# Patient Record
Sex: FEMALE | Race: WHITE | NOT HISPANIC OR LATINO | Employment: OTHER | URBAN - NONMETROPOLITAN AREA
[De-identification: names, ages, dates, MRNs, and addresses within clinical notes are randomized per-mention and may not be internally consistent; named-entity substitution may affect disease eponyms.]

---

## 2017-01-23 ENCOUNTER — OFFICE VISIT (OUTPATIENT)
Dept: OTOLARYNGOLOGY | Facility: CLINIC | Age: 77
End: 2017-01-23

## 2017-01-23 ENCOUNTER — OFFICE VISIT (OUTPATIENT)
Dept: UROLOGY | Facility: CLINIC | Age: 77
End: 2017-01-23

## 2017-01-23 VITALS
SYSTOLIC BLOOD PRESSURE: 125 MMHG | WEIGHT: 151 LBS | DIASTOLIC BLOOD PRESSURE: 64 MMHG | RESPIRATION RATE: 20 BRPM | TEMPERATURE: 97.7 F | HEIGHT: 65 IN | HEART RATE: 79 BPM | BODY MASS INDEX: 25.16 KG/M2

## 2017-01-23 VITALS
BODY MASS INDEX: 25.72 KG/M2 | WEIGHT: 154.4 LBS | DIASTOLIC BLOOD PRESSURE: 70 MMHG | HEIGHT: 65 IN | SYSTOLIC BLOOD PRESSURE: 120 MMHG | TEMPERATURE: 97.2 F

## 2017-01-23 DIAGNOSIS — H57.819 BROW PTOSIS: Primary | ICD-10-CM

## 2017-01-23 DIAGNOSIS — N30.90 RECURRENT CYSTITIS: Primary | ICD-10-CM

## 2017-01-23 DIAGNOSIS — H02.30 PSEUDOPTOSIS, UNSPECIFIED LATERALITY: ICD-10-CM

## 2017-01-23 LAB
BILIRUB BLD-MCNC: NEGATIVE MG/DL
CLARITY, POC: CLEAR
COLOR UR: YELLOW
GLUCOSE UR STRIP-MCNC: NEGATIVE MG/DL
KETONES UR QL: NEGATIVE
LEUKOCYTE EST, POC: ABNORMAL
NITRITE UR-MCNC: NEGATIVE MG/ML
PH UR: 5.5 [PH] (ref 5–8)
PROT UR STRIP-MCNC: NEGATIVE MG/DL
RBC # UR STRIP: NEGATIVE /UL
SP GR UR: 1.02 (ref 1–1.03)
UROBILINOGEN UR QL: NORMAL

## 2017-01-23 PROCEDURE — 81003 URINALYSIS AUTO W/O SCOPE: CPT | Performed by: UROLOGY

## 2017-01-23 PROCEDURE — 99213 OFFICE O/P EST LOW 20 MIN: CPT | Performed by: UROLOGY

## 2017-01-23 PROCEDURE — 99024 POSTOP FOLLOW-UP VISIT: CPT | Performed by: OTOLARYNGOLOGY

## 2017-01-23 NOTE — PROGRESS NOTES
Yeni Pastor presents for a routine postoperative visit following endoscopic pretrichial browlift/pexy and excisional biopsy of left lower eyelid lesion on 12/1/16.     Subjective: Since surgery, she is doing well without complaints.  Symptoms denied postoperatively include fever, chills, bleeding and drainage. The patient states the pain has ceased since surgery. She reports her visual fields have improved.  He still has some scalp numbness.    Objective:   Pathology review: Pathology is available. Pathology demonstrates hyperkeratosis with no evidence of malignancy of the left lower eyelid.        Physical Exam   Constitutional: She appears well-developed and well-nourished. She is cooperative. No distress.   HENT:   Frontal branch of CN VII intact- symmetrical movement of bilateral brows.  Excellent brow position   Neck: Phonation normal.   Pulmonary/Chest: Effort normal. No stridor. No respiratory distress.   Neurological: She is alert.   Psychiatric: She has a normal mood and affect. Her speech is normal and behavior is normal.     Assessment:  Yeni was seen today for follow-up.    Diagnoses and all orders for this visit:    Brow ptosis    Pseudoptosis, unspecified laterality        Plan:  Patient Instructions   Protect the incisions from sunlight. Sunlight to the incisions will cause permanent pigmentation to the incision line and make the incision more noticeable. After the incision has reepithelialized, you may begin to use sunscreen with an SPF of 15 or greater    I discussed the use of  Vitamin E, Mederma, or a high-quality extra virgin olive oil to the incisions to optimize the end result. Apply topically twice daily, or as directed, to help optimize wound healing and decrease erythema.    Discussed the importance of routine skin checks with a dermatologist every 6-12 months.         Return if symptoms worsen or fail to improve.

## 2017-01-23 NOTE — MR AVS SNAPSHOT
Yeni Pastor   1/23/2017 3:20 PM   Office Visit    Dept Phone:  [phone]   Encounter #:  [id number]    Provider:  Shaun Allen MD   Department:  [facility] UROLOGY                Your Full Care Plan              Your Updated Medication List          This list is accurate as of: 1/23/17  3:41 PM.  Always use your most recent med list.                albuterol 108 (90 BASE) MCG/ACT inhaler   Commonly known as:  PROVENTIL HFA;VENTOLIN HFA       budesonide-formoterol 160-4.5 MCG/ACT inhaler   Commonly known as:  SYMBICORT       ergocalciferol 78652 UNITS capsule   Commonly known as:  ERGOCALCIFEROL       esomeprazole 40 MG capsule   Commonly known as:  nexIUM       levothyroxine 50 MCG tablet   Commonly known as:  SYNTHROID, LEVOTHROID       melatonin 3 MG tablet       PROBIOTIC DAILY PO       * SINGULAIR 10 MG tablet   Generic drug:  montelukast       * montelukast 10 MG tablet   Commonly known as:  SINGULAIR       theophylline 600 MG 24 hr tablet   Commonly known as:  UNIPHYL       * Notice:  This list has 2 medication(s) that are the same as other medications prescribed for you. Read the directions carefully, and ask your doctor or other care provider to review them with you.            We Performed the Following     POC Urinalysis Dipstick, Automated       You Were Diagnosed With        Codes Comments    Recurrent cystitis    -  Primary ICD-10-CM: N30.90  ICD-9-CM: 595.9       Instructions     None    Patient Instructions History      Upcoming Appointments     Visit Type Date Time Department    FOLLOW UP 1/23/2017  1:30 PM [facility]    FOLLOW UP 1/23/2017  3:20 PM [facility]      [facility] Signup     [facility] MyColorScreen allows you to send messages to your doctor, view your test results, renew your prescriptions, schedule appointments, and more. To sign up, go to [employer] and click on the Sign Up Now link in the New User? box. Enter  "your [employer] Activation Code exactly as it appears below along with the last four digits of your Social Security Number and your Date of Birth () to complete the sign-up process. If you do not sign up before the expiration date, you must request a new code.    [employer] Activation Code: [email or web address]  Expires: 2017  1:47 PM    If you have questions, you can email [email or web address] or call [phone] to talk to our [employer] staff. Remember, [employer] is NOT to be used for urgent needs. For medical emergencies, dial 911.               Other Info from Your Visit           Allergies     Latex  Rash    Gloves    Marcaine [Bupivacaine Hcl]  Other (See Comments)    MOUTH SORES    Codeine  Other (See Comments)    Chest pain    Tramadol  Confusion      Vital Signs     Blood Pressure Temperature Height Weight Body Mass Index Smoking Status    120/70 97.2 °F (36.2 °C) 65\" (165.1 cm) 154 lb 6.4 oz (70 kg) 25.69 kg/m2 Never Smoker      Problems and Diagnoses Noted     Inflammation of bladder    -  Primary      Results     POC Urinalysis Dipstick, Automated      Component Value Standard Range & Units    Color Yellow Yellow, Straw, Dark Yellow, Nahed    Clarity, UA Clear Clear    Glucose, UA Negative Negative, 1000 mg/dL (3+) mg/dL    Bilirubin Negative Negative    Ketones, UA Negative Negative    Specific Gravity  1.020 1.005 - 1.030    Blood, UA Negative Negative    pH, Urine 5.5 5.0 - 8.0    Protein, POC Negative Negative mg/dL    Urobilinogen, UA Normal Normal    Leukocytes Moderate (2+) Negative    Nitrite, UA Negative Negative                    "

## 2017-01-23 NOTE — MR AVS SNAPSHOT
Yeni Pastor   2017 1:30 PM   Office Visit    Dept Phone:  [phone]   Encounter #:  [id number]    Provider:  Romario Barber MD   Department:  [facility]                Your Full Care Plan              Your Updated Medication List          This list is accurate as of: 17  1:47 PM.  Always use your most recent med list.                albuterol 108 (90 BASE) MCG/ACT inhaler   Commonly known as:  PROVENTIL HFA;VENTOLIN HFA       budesonide-formoterol 160-4.5 MCG/ACT inhaler   Commonly known as:  SYMBICORT       ergocalciferol 35306 UNITS capsule   Commonly known as:  ERGOCALCIFEROL       esomeprazole 40 MG capsule   Commonly known as:  nexIUM       levothyroxine 50 MCG tablet   Commonly known as:  SYNTHROID, LEVOTHROID       melatonin 3 MG tablet       PROBIOTIC DAILY PO       * SINGULAIR 10 MG tablet   Generic drug:  montelukast       * montelukast 10 MG tablet   Commonly known as:  SINGULAIR       theophylline 600 MG 24 hr tablet   Commonly known as:  UNIPHYL       * Notice:  This list has 2 medication(s) that are the same as other medications prescribed for you. Read the directions carefully, and ask your doctor or other care provider to review them with you.            Instructions     None    Patient Instructions History      Upcoming Appointments     Visit Type Date Time Department    FOLLOW UP 2017  1:30 PM [facility]    FOLLOW UP 2017  3:20 PM [facility]      [facility] Signup     [facility] Stir allows you to send messages to your doctor, view your test results, renew your prescriptions, schedule appointments, and more. To sign up, go to [employer] and click on the Sign Up Now link in the New User? box. Enter your [employer] Activation Code exactly as it appears below along with the last four digits of your Social Security Number and your Date of Birth () to complete the sign-up process. If you do not sign up  "before the expiration date, you must request a new code.    [employer] Activation Code: [email or web address]  Expires: 2/6/2017  1:47 PM    If you have questions, you can email [email or web address] or call [phone] to talk to our [employer] staff. Remember, MyChart is NOT to be used for urgent needs. For medical emergencies, dial 911.               Other Info from Your Visit           Your Appointments     Jan 23, 2017  3:20 PM CST   Follow Up with Shaun Allen MD   [facility] UROLOGY (--)    [address] [place] [id number] [phone]           Arrive 15 minutes prior to appointment.              Allergies     Latex  Rash    Gloves    Marcaine [Bupivacaine Hcl]  Other (See Comments)    MOUTH SORES    Codeine  Other (See Comments)    Chest pain    Tramadol  Confusion      Reason for Visit     Follow-up BROW LIFT / LOWER EYELID LESION EXC      Vital Signs     Blood Pressure Pulse Temperature Respirations Height Weight    125/64 79 97.7 °F (36.5 °C) 20 65\" (165.1 cm) 151 lb (68.5 kg)    Body Mass Index Smoking Status                25.13 kg/m2 Never Smoker            "

## 2017-01-23 NOTE — PROGRESS NOTES
Ms. Pastor is 76 y.o. female    CHIEF COMPLAINT: I am here about urinary tract infections    HPI  Recurrent UTI  Patient is here for recurrent UTI.  This is a(an) subsequent visit. Possible coexisting conditions or situations that may predispose the patient to urinary tract include no obvious predisposition. The infections have been occurring for 2year(s).  There has been 2 infections in the last 1 year(s).  Organ involvement suggested by referring physician or patient is bladder. 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  Other evaluation includes cystoscopy and renal ultrasound.  Symptoms, when present, include dysuria and urgency and frequency. The patient has been treated with  prophylactic antibiotics  effective and topical hormonal replacement   effective.           The following portions of the patient's history were reviewed and updated as appropriate: allergies, current medications, past family history, past medical history, past social history, past surgical history and problem list.    Review of Systems   Constitutional: Negative for chills and fever.   Gastrointestinal: Negative for abdominal pain, anal bleeding and blood in stool.   Genitourinary: Negative for flank pain and hematuria.         Current Outpatient Prescriptions:   •  albuterol (PROVENTIL HFA;VENTOLIN HFA) 108 (90 BASE) MCG/ACT inhaler, Inhale 2 puffs Every 4 (Four) Hours As Needed., Disp: , Rfl:   •  budesonide-formoterol (SYMBICORT) 160-4.5 MCG/ACT inhaler, Inhale 2 puffs 2 (Two) Times a Day., Disp: , Rfl:   •  ergocalciferol (ERGOCALCIFEROL) 52469 UNITS capsule, Take 50,000 Units by mouth 1 (One) Time Per Week., Disp: , Rfl:   •  esomeprazole (nexIUM) 40 MG capsule, Take 40 mg by mouth Daily., Disp: , Rfl:   •  levothyroxine (SYNTHROID, LEVOTHROID) 50 MCG tablet, Take 50 mcg by mouth Daily., Disp: , Rfl:   •  melatonin 3 MG tablet, Take 3 mg by mouth daily, Disp: , Rfl:   •  montelukast (SINGULAIR) 10 MG  "tablet, Take 10 mg by mouth Daily., Disp: , Rfl:   •  montelukast (SINGULAIR) 10 MG tablet, Take 10 mg by mouth daily., Disp: , Rfl:   •  Probiotic Product (PROBIOTIC DAILY PO), Take 1 tablet/day by mouth Daily., Disp: , Rfl:   •  theophylline (UNIPHYL) 600 MG 24 hr tablet, Take 600 mg by mouth Daily., Disp: , Rfl:     Past Medical History   Diagnosis Date   • Asthma    • Brow ptosis    • Dental crowns present    • Dysphagia    • GERD (gastroesophageal reflux disease)    • History of multiple concussions    • Hypothyroidism    • 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   • Pseudoptosis    • Visual field defect      Unspecified       Past Surgical History   Procedure Laterality Date   • Blepharoplasty Bilateral      Bilateral Upper   •  section     • Cataract extraction     • Cholecystectomy     • Hysterectomy       Total   • Back surgery       C3 REPLACEMENT   • Bunionectomy Right    • Endoscopic browlift Bilateral 2016     Procedure: Pretrichial endoscopic brow lift, with excision left lower eyelid lesion;  Surgeon: Romario Barber MD;  Location: [facility];  Service:        Social History     Social History   • Marital status:      Spouse name: N/A   • Number of children: N/A   • Years of education: N/A     Social History Main Topics   • Smoking status: Never Smoker   • Smokeless tobacco: None   • Alcohol use No   • Drug use: No   • Sexual activity: Defer     Other Topics Concern   • None     Social History Narrative       Family History   Problem Relation Age of Onset   • Diabetes Mother    • Heart disease Mother    • Hypertension Mother    • Heart disease Father    • Hypertension Father    • Cancer Sister    • Heart disease Brother        Visit Vitals   • /70   • Temp 97.2 °F (36.2 °C)   • Ht 65\" (165.1 cm)   • Wt 154 lb 6.4 oz (70 kg)   • BMI 25.69 kg/m2       Physical Exam   Constitutional: She is oriented to person, place, and time. She appears well-developed and " well-nourished. No distress.   Pulmonary/Chest: Effort normal.   Abdominal: Soft. She exhibits no distension and no mass. There is no tenderness. There is no rebound and no guarding. No hernia.   Neurological: She is alert and oriented to person, place, and time.   Skin: Skin is warm and dry. She is not diaphoretic.   Psychiatric: She has a normal mood and affect.   Vitals reviewed.        Results for orders placed or performed in visit on 01/23/17   POC Urinalysis Dipstick, Automated   Result Value Ref Range    Color Yellow Yellow, Straw, Dark Yellow, Nahed    Clarity, UA Clear Clear    Glucose, UA Negative Negative, 1000 mg/dL (3+) mg/dL    Bilirubin Negative Negative    Ketones, UA Negative Negative    Specific Gravity  1.020 1.005 - 1.030    Blood, UA Negative Negative    pH, Urine 5.5 5.0 - 8.0    Protein, POC Negative Negative mg/dL    Urobilinogen, UA Normal Normal    Leukocytes Moderate (2+) (A) Negative    Nitrite, UA Negative Negative       Imaging Results (last 7 days)     ** No results found for the last 168 hours. **          Assessment and Plan  Diagnoses and all orders for this visit:    Recurrent cystitis  -     POC Urinalysis Dipstick, Automated     this problem is stable.  She has had a fairly recent infection.  I encouraged her to continue hormonal therapy on twice-daily basis.    Gloria Judd, [facility]  01/23/17  2:34 PM    EMR Dragon/Transcription disclaimer:  Much of this encounter note is an electronic transcription/translation of spoken language to printed text. The electronic translation of spoken language may permit erroneous, or at times, nonsensical words or phrases to be inadvertently transcribed; although I have reviewed the note for such errors, some may still exist.       Cc: Dr. Keller

## 2017-01-23 NOTE — LETTER
January 23, 2017     No Recipients    Patient: Yeni Pastor   YOB: 1940   Date of Visit: 1/23/2017       Dear Dr. Rocha Recipients:    Thank you for referring Yeni Pastor to me for evaluation. Below are the relevant portions of my assessment and plan of care.    If you have questions, please do not hesitate to call me. I look forward to following Yeni along with you.         Sincerely,        Romario Barber MD        CC: No Recipients  Romario Barber MD  1/23/2017  1:48 PM  Signed  Yeni Pastor presents for a routine postoperative visit following endoscopic pretrichial browlift/pexy and excisional biopsy of left lower eyelid lesion on 12/1/16.     Subjective: Since surgery, she is doing well without complaints.  Symptoms denied postoperatively include fever, chills, bleeding and drainage. The patient states the pain has ceased since surgery. She reports her visual fields have improved.  He still has some scalp numbness.    Objective:   Pathology review: Pathology is available. Pathology demonstrates hyperkeratosis with no evidence of malignancy of the left lower eyelid.        Physical Exam   Constitutional: She appears well-developed and well-nourished. She is cooperative. No distress.   HENT:   Frontal branch of CN VII intact- symmetrical movement of bilateral brows.  Excellent brow position   Neck: Phonation normal.   Pulmonary/Chest: Effort normal. No stridor. No respiratory distress.   Neurological: She is alert.   Psychiatric: She has a normal mood and affect. Her speech is normal and behavior is normal.     Assessment:  Yeni was seen today for follow-up.    Diagnoses and all orders for this visit:    Brow ptosis    Pseudoptosis, unspecified laterality        Plan:  Patient Instructions   Protect the incisions from sunlight. Sunlight to the incisions will cause permanent pigmentation to the incision line and make the incision more noticeable. After the incision has reepithelialized, you  may begin to use sunscreen with an SPF of 15 or greater    I discussed the use of  Vitamin E, Mederma, or a high-quality extra virgin olive oil to the incisions to optimize the end result. Apply topically twice daily, or as directed, to help optimize wound healing and decrease erythema.    Discussed the importance of routine skin checks with a dermatologist every 6-12 months.         Return if symptoms worsen or fail to improve.

## 2017-03-14 ENCOUNTER — TELEPHONE (OUTPATIENT)
Dept: UROLOGY | Facility: CLINIC | Age: 77
End: 2017-03-14

## 2017-03-14 NOTE — TELEPHONE ENCOUNTER
----- Message from Nicolasa Lujan sent at 3/14/2017  1:12 PM CDT -----  Contact: PATIENT  Patient has an uti. Her symptoms are frequency and she is having burning and pressure. She said that Dr. Allen has prescribed her macrodantin in the past.

## 2017-06-16 DIAGNOSIS — Z79.899 CURRENT USE OF PROTON PUMP INHIBITOR: Primary | ICD-10-CM

## 2017-11-21 ENCOUNTER — OFFICE VISIT (OUTPATIENT)
Dept: CARDIOLOGY | Age: 77
End: 2017-11-21
Payer: MEDICARE

## 2017-11-21 ENCOUNTER — TELEPHONE (OUTPATIENT)
Dept: CARDIOLOGY | Age: 77
End: 2017-11-21

## 2017-11-21 VITALS
HEIGHT: 63 IN | HEART RATE: 80 BPM | SYSTOLIC BLOOD PRESSURE: 138 MMHG | DIASTOLIC BLOOD PRESSURE: 82 MMHG | BODY MASS INDEX: 26.93 KG/M2 | WEIGHT: 152 LBS

## 2017-11-21 DIAGNOSIS — R07.9 CHEST PAIN, UNSPECIFIED TYPE: Primary | ICD-10-CM

## 2017-11-21 DIAGNOSIS — R00.0 TACHYCARDIA: ICD-10-CM

## 2017-11-21 DIAGNOSIS — R06.02 SOB (SHORTNESS OF BREATH): ICD-10-CM

## 2017-11-21 DIAGNOSIS — I10 ESSENTIAL HYPERTENSION: ICD-10-CM

## 2017-11-21 PROCEDURE — 1036F TOBACCO NON-USER: CPT | Performed by: NURSE PRACTITIONER

## 2017-11-21 PROCEDURE — G8419 CALC BMI OUT NRM PARAM NOF/U: HCPCS | Performed by: NURSE PRACTITIONER

## 2017-11-21 PROCEDURE — 1123F ACP DISCUSS/DSCN MKR DOCD: CPT | Performed by: NURSE PRACTITIONER

## 2017-11-21 PROCEDURE — 99215 OFFICE O/P EST HI 40 MIN: CPT | Performed by: NURSE PRACTITIONER

## 2017-11-21 PROCEDURE — 93000 ELECTROCARDIOGRAM COMPLETE: CPT | Performed by: NURSE PRACTITIONER

## 2017-11-21 PROCEDURE — G8484 FLU IMMUNIZE NO ADMIN: HCPCS | Performed by: NURSE PRACTITIONER

## 2017-11-21 PROCEDURE — 4040F PNEUMOC VAC/ADMIN/RCVD: CPT | Performed by: NURSE PRACTITIONER

## 2017-11-21 PROCEDURE — G8400 PT W/DXA NO RESULTS DOC: HCPCS | Performed by: NURSE PRACTITIONER

## 2017-11-21 PROCEDURE — 1090F PRES/ABSN URINE INCON ASSESS: CPT | Performed by: NURSE PRACTITIONER

## 2017-11-21 PROCEDURE — G8427 DOCREV CUR MEDS BY ELIG CLIN: HCPCS | Performed by: NURSE PRACTITIONER

## 2017-11-21 RX ORDER — ATENOLOL 25 MG/1
25 TABLET ORAL NIGHTLY
Qty: 30 TABLET | Refills: 5 | Status: SHIPPED | OUTPATIENT
Start: 2017-11-21 | End: 2019-02-26

## 2017-11-21 NOTE — PROGRESS NOTES
[facility], [place]. [place] [address] [place] [address] 055, 042 [facility]  [phone] office  [phone] fax      OFFICE VISIT:  2017    Jairo Winter - : 1940    Reason For Visit:  Shara Pappas is a 68 y.o. female who is here for New Patient (New patient referral from Dr. Heriberto Soni for HTN, chest pain, RANGEL and tachycardia.); Chest Pain; Tachycardia; Hypertension; and Shortness of Breath    The patient presents today as a new patient referral from Dr. Jun Navarro. The patient has been having problems with elevated blood pressure and episodes of tachycardia. She reports \"my heart rate got up to 180 once. \"  Onset of symptoms approximately 2 months ago. The patient also reports episodes of sharp chest pain with exertion. The discomfort radiates into the left chest and scapula. She reports associated RANGEL. The patient also reports a history of asthma. She is on thyroid replacement therapy and reports recent labs were normal.  The patient reports a family history of CAD in mother and (2) brothers. She also reports elevated cholesterol \"but my good cholesterol makes the difference. \"   The patient's PCP monitors cholesterol. Subjective  Shara Pappas denies exertional chest pain, orthopnea, paroxysmal nocturnal dyspnea, syncope, presyncope, edema and fatigue. The patient denies numbness or weakness to suggest cerebrovascular accident or transient ischemic attack. + mid-sternal chest pain with radiation to left chest and scapular. Symptoms occur with exertion. Reports associated SOA. + episodes of tachycardia.     Jairo Winter has the following history as recorded in [facility]:    Patient Active Problem List   Diagnosis Code    History of adenomatous polyp of colon Z86.010    Epigastric pain R10.13    GERD (gastroesophageal reflux disease) K21.9    Diverticulosis K57.90    Migraine G43.909    History of traumatic brain injury Z87.820    Chest pain R07.9    SOB (shortness of breath) R06.02    Biatrial enlargement I51.7    Mild mitral regurgitation I34.0    Ankle edema M25.473    LLQ abdominal pain R10.32    Tortuous colon Q43.8    Current use of proton pump inhibitor Z79.899    Essential hypertension I10    Tachycardia R00.0     Past Medical History:   Diagnosis Date    Abdominal pain, other specified site     Asthma     Cataract     GERD (gastroesophageal reflux disease)     Glaucoma     Headache     Vitamin D deficiency      Past Surgical History:   Procedure Laterality Date    APPENDECTOMY      CATARACT REMOVAL      CERVICAL DISC SURGERY       SECTION      CHOLECYSTECTOMY      COLONOSCOPY  2009    Dr Мария Valdes    COLONOSCOPY  2012    Dr Nette Sorto ENDOSCOPY  2009    Dr Andre Knutson ENDOSCOPY  2012    Dr Мария Valdes     Family History   Problem Relation Age of Onset    Cancer Sister      breast cancer    Heart Disease Brother     Heart Disease Brother     Colon Polyps Neg Hx     COPD Neg Hx     Esophageal Cancer Neg Hx     Liver Cancer Neg Hx     Liver Disease Neg Hx     Rectal Cancer Neg Hx     Stomach Cancer Neg Hx      Social History   Substance Use Topics    Smoking status: Never Smoker    Smokeless tobacco: Never Used    Alcohol use No      Current Outpatient Prescriptions   Medication Sig Dispense Refill    Melatonin 1 MG CAPS Take 1 mg by mouth as needed      atenolol (TENORMIN) 25 MG tablet Take 1 tablet by mouth nightly 30 tablet 5    levothyroxine (SYNTHROID) 50 MCG tablet Take 50 mcg by mouth Daily      ergocalciferol (ERGOCALCIFEROL) 20931 UNITS capsule Take 50,000 Units by mouth once a week      Probiotic Product (PROBIOTIC ADVANCED PO) Take by mouth      budesonide-formoterol (SYMBICORT) 160-4.5 MCG/ACT AERO Inhale 2 puffs into the lungs 2 times daily      esomeprazole (NEXIUM) 40 MG capsule Take 1 capsule by mouth every morning (before breakfast) 90 capsule 3    Montelukast Sodium (SINGULAIR PO) Take 10 mg by mouth daily.  Theophylline (UNIPHYL PO) Take 600 mg by mouth daily.  Albuterol Sulfate (PROAIR HFA IN) Inhale  into the lungs as needed. No current facility-administered medications for this visit. Allergies: Latex; Codeine; and Marcaine [bupivacaine hcl]    Review of Systems  Constitutional  no appetite change, or unexpected weight change. No fever, chills or diaphoresis. No significant change in activity level or new onset of fatigue. HEENT  no significant rhinorrhea or epistaxis. No tinnitus or significant hearing loss. Eyes  no sudden vision change or amaurosis. No corneal arcus, xantholasma, subconjunctival hemorrhage or discharge. Respiratory  no significant wheezing, stridor, apnea or cough. No dyspnea on exertion or shortness of air. Cardiovascular  no orthopnea or PND. No occurrence of slow heart rate. No palpitations. No claudication. No leg edema.+ mid-sternal chest pain with radiation to left chest and scapular. Symptoms occur with exertion. Reports associated SOA. + episodes of tachycardia. Gastrointestinal  no abdominal swelling or pain. No blood in stool. No severe constipation, diarrhea, nausea, or vomiting. Genitourinary  no dysuria, frequency, or urgency. No flank pain or hematuria. Musculoskeletal  no back pain or myalgia. No problems with gait. Extremities - no clubbing, cyanosis or edema. Skin  no color change or rash. No pallor. No new surgical incision. Neurologic  no speech difficulty, facial asymmetry or lateralizing weakness. No seizures, presyncope or syncope. No significant dizziness. Hematologic  no easy bruising or excessive bleeding. Psychiatric  no severe anxiety or insomnia. No confusion. All other review of systems are negative.     Objective  Vital Signs - BP 138/82   Pulse 80   Ht 5' 3\" (1.6 m)   Wt 152 lb (68.9 kg)   BMI 26.93 kg/m²   General - Preethi Roberson is alert, cooperative, and pleasant. Well groomed. No acute distress. Body habitus - Body mass index is 26.93 kg/m². HEENT  Head is normocephalic. No circumoral cyanosis. Dentition is normal.  EYES -   Lids normal without ptosis. No discharge, edema or subconjunctival hemorrhage. Neck - Symmetrical without apparent mass or lymphadenopathy. Respiratory - Normal respiratory effort without use of accessory muscles. Ausculatation reveals vesicular breath sounds without crackles, wheezes, rub or rhonchi. Cardiovascular  No jugular venous distention. Auscultation reveals regular rate and rhythm. No audible clicks, gallop or rub. No murmur. No lower extremity varicosities. No carotid bruits. Abdominal -  No visible distention, mass or pulsations. Extremities - No clubbing or cyanosis. No statis dermatitis or ulcers. No edema. Musculoskeletal -   No Osler's nodes. No kyphosis or scoliosis. Gait is even and regular without limp or shuffle. Ambulates without assistance. Skin -  Warm and dry; no rash or pallor. No new surgical wound. Neurological - No focal neurological deficits. Thought processes coherent. No apparent tremor. Oriented to person, place and time. Psychiatric -  Appropriate affect and mood. Assessment:    1. Chest pain, unspecified type  NM MYOCARDIAL SPECT REST EXERCISE OR RX    ECHO Complete 2D W Doppler W Color   2. SOB (shortness of breath)  EKG 12 lead    ECHO Complete 2D W Doppler W Color   3. Tachycardia  Cardiac event monitor    ECHO Complete 2D W Doppler W Color   4. Essential hypertension  ECHO Complete 2D W Doppler W Color     EKG reviewed:  NSR 79 BPM; no acute ischemic changes or ectopy. Due to chest pain with SOA, patient will proceed with Lexiscan and 2D echocardiogram.  A 2 week Zio cardiac monitor was placed for further evaluation of tachycardia. Date/Time:  To be scheduled. [place] at the [facility] and [address] [facility] located on the first floor of [place] [address] through hospital main entrance and turn immediately to your left. Test Description:  There are two parts to a Lexiscan stress test: the rest portion and the exercise portion. For the rest portion, a radioactive tracer is injected into your arm through the IV. After 30 to 60 minutes, the process of imaging will begin. A nuclear camera will be placed on your chest area and images are taken for the next 15 to 20 minutes. For the exercise portion, a nurse will attach EKG electrodes to your chest to monitor your heart rate. The drug Excell Crock is administered to simulate stress on the heart. Your heart rhythm will then be monitored for the next few minutes. Your blood pressure will also be monitored throughout the exercise portion. [place] through the exercise portion, a second round of radioactive tracer is injected into your body. Your heart rate and EKG will be monitored for another few minutes after administering the drug. Test Preparation:     Bring a list of your current medications. Do not take any of your medications the morning of the test, but bring all morning medications with you as you will take them after the stress portion of the test is completed.  No caffeine 12 hours prior to the testing. This includes: coffee, pop/soda, chocolate, cold medications, etc.  Any product that might contain caffeine.  No nicotine or alcohol 12 hours prior to your test.    Nothing to eat or drink 4-6 hours prior to appointment time. It is okay to drink small amounts of water during the four hours prior to the test.   Nitroglycerin patches must be taken off 1 hour before testing.  Wear comfortable clothing.     Please refrain from any strenuous exercise or activities the day before your test, or the day of your test.   The [facility] [address] [place] [address] test takes about 2 ½ to 3 hours to complete. If for any reason you are unable to keep this appointment, please contact Outpatient Scheduling, [phone], as soon as possible to reschedule. What is a [place]? Luther Carver may be ordered by for those unable to exercise enough to increase blood flow to their heart during an exercise stress test.  [place] is used to help produce images of the heart for diagnosing blocked heart arteries. This test is not invasive. You will be awake during the entire test.    Your heart rhythm, blood pressure and oxygen level will be monitored during the test.  A small catheter will be placed in an arm vein. [place] is injected through the catheter into your bloodstream for 10 seconds followed immediately by saline solution to clear the line. [place] dilates the heart arteries to allow increased blood flow to the heart. 10-20 seconds after the saline solution, a small dose of radioactive isotope imaging tracer is injected into the catheter. After the tracer is absorbed in your system and distributed to the heart arteries, a special camera will take detailed pictures of your heart. The pictures will show how well the blood flows into your heart and if there are any areas of blockage. While you lie on your back with your arms above your head, the camera will take pictures for about 20-40 minutes. One set of images will be taken when the [place] is active in your system, and a second set of images will be taken when you're considered at rest.      Echocardiogram -  No prep. Takes approximately 30 min. An echocardiogram uses sound waves to produce images of your heart. This commonly used test allows your doctor to see how your heart is beating and pumping blood. Your doctor can use the images from an echocardiogram to identify various abnormalities in the heart muscle and valves.   This test has 2 parts:   Ø You will be asked to disrobe from the waist up and given

## 2017-11-21 NOTE — PATIENT INSTRUCTIONS
Start Atenolol 25 mg (1) tab at bedtime to lower blood pressure and heart rate. Continue other current medications as prescribed. Continue to follow up with primary care provider for non cardiac medical problems. Call the office with any problems, questions or concerns at [phone]. Follow up as scheduled with your cardiologist - Dr. Elva Soler one month. The following educational material has been included in this after visit summary for your review: heart health.     Additional instructions:  Coronary artery disease risk factors you can control: Smoking, high blood pressure, high cholesterol, diabetes, being overweight, lack of exercise and stress. Continue heart healthy diet. Take medications as directed. Exercise as tolerated. Strive for 15 minutes of exercise most days of the week. If asked to keep a blood pressure log, do so for 2 weeks. Call the office to report readings at [phone]. Blood pressure goal is 140/90 or less. If you are a diabetic, the goal is 130/80 or less. If you are taking cholesterol lowering medications, it is recommended that lab work be checked annually. Always keep a current medication list. Bring your medications to every office visit. Lavaughn Arabia Nuclear Stress Test     Date/Time:    [place] at the [facility] and [address] [facility] located on the first floor of [place] [address] through hospital main entrance and turn immediately to your left. Test Description:  There are two parts to a Lexiscan stress test: the rest portion and the exercise portion. For the rest portion, a radioactive tracer is injected into your arm through the IV. After 30 to 60 minutes, the process of imaging will begin. A nuclear camera will be placed on your chest area and images are taken for the next 15 to 20 minutes. For the exercise portion, a nurse will attach EKG electrodes to your chest to monitor your heart rate.   The drug Lavaughn Arabia is administered to desserts, and soda pop. Lifestyle changes  · If your doctor recommends it, get more exercise. Walking is a good choice. Bit by bit, increase the amount you walk every day. Try for at least 30 minutes on most days of the week. You also may want to swim, bike, or do other activities. · Do not smoke. If you need help quitting, talk to your doctor about stop-smoking programs and medicines. These can increase your chances of quitting for good. Quitting smoking may be the most important step you can take to protect your heart. It is never too late to quit. You will get health benefits right away. · Limit alcohol to 2 drinks a day for men and 1 drink a day for women. Too much alcohol can cause health problems. Medicines  · Take your medicines exactly as prescribed. Call your doctor if you think you are having a problem with your medicine. · If your doctor recommends aspirin, take the amount directed each day. Make sure you take aspirin and not another kind of pain reliever, such as acetaminophen (Tylenol). If you take ibuprofen (such as Advil or Motrin) for other problems, take aspirin at least 2 hours before taking ibuprofen. When should you call for help? Call 911 if you have symptoms of a heart attack. These may include:  · You have chest pain or pressure. This may occur with:  ¨ Chest pain or pressure, or a strange feeling in the chest.  ¨ Sweating. ¨ Shortness of breath. ¨ Pain, pressure, or a strange feeling in the back, neck, jaw, or upper belly or in one or both shoulders or arms. ¨ Lightheadedness or sudden weakness. ¨ A fast or irregular heartbeat. After you call 911, the  may tell you to chew 1 adult-strength or 2 to 4 low-dose aspirin. Wait for an ambulance. Do not try to drive yourself. Watch closely for changes in your health, and be sure to contact your doctor if:  · Your symptoms are slowly getting worse. · You do not get better as expected. Where can you learn more?   Go to

## 2017-12-18 ENCOUNTER — HOSPITAL ENCOUNTER (OUTPATIENT)
Dept: NUCLEAR MEDICINE | Age: 77
Discharge: HOME | End: 2017-12-18
Payer: MEDICARE

## 2017-12-18 ENCOUNTER — HOSPITAL ENCOUNTER (OUTPATIENT)
Dept: NON INVASIVE DIAGNOSTICS | Age: 77
Discharge: HOME | End: 2017-12-18
Payer: MEDICARE

## 2017-12-18 DIAGNOSIS — R07.9 CHEST PAIN, UNSPECIFIED TYPE: ICD-10-CM

## 2017-12-18 DIAGNOSIS — R06.02 SOB (SHORTNESS OF BREATH): ICD-10-CM

## 2017-12-18 DIAGNOSIS — R00.0 TACHYCARDIA: ICD-10-CM

## 2017-12-18 DIAGNOSIS — I10 ESSENTIAL HYPERTENSION: ICD-10-CM

## 2017-12-18 LAB
LV EF: 58 %
LV EF: 64 %
LVEF MODALITY: NORMAL
LVEF MODALITY: NORMAL

## 2017-12-18 PROCEDURE — 6360000002 HC RX W HCPCS: Performed by: NURSE PRACTITIONER

## 2017-12-18 PROCEDURE — 93306 TTE W/DOPPLER COMPLETE: CPT

## 2017-12-18 PROCEDURE — 3430000000 HC RX DIAGNOSTIC RADIOPHARMACEUTICAL: Performed by: NURSE PRACTITIONER

## 2017-12-18 PROCEDURE — A9500 TC99M SESTAMIBI: HCPCS | Performed by: NURSE PRACTITIONER

## 2017-12-18 PROCEDURE — 78452 HT MUSCLE IMAGE SPECT MULT: CPT

## 2017-12-18 PROCEDURE — 93017 CV STRESS TEST TRACING ONLY: CPT

## 2017-12-18 RX ADMIN — REGADENOSON 0.4 MG: 0.08 INJECTION, SOLUTION INTRAVENOUS at 12:37

## 2017-12-18 RX ADMIN — TETRAKIS(2-METHOXYISOBUTYLISOCYANIDE)COPPER(I) TETRAFLUOROBORATE 10 MILLICURIE: 1 INJECTION, POWDER, LYOPHILIZED, FOR SOLUTION INTRAVENOUS at 12:37

## 2017-12-18 RX ADMIN — TETRAKIS(2-METHOXYISOBUTYLISOCYANIDE)COPPER(I) TETRAFLUOROBORATE 30 MILLICURIE: 1 INJECTION, POWDER, LYOPHILIZED, FOR SOLUTION INTRAVENOUS at 12:37

## 2017-12-26 ENCOUNTER — OFFICE VISIT (OUTPATIENT)
Dept: CARDIOLOGY | Age: 77
End: 2017-12-26
Payer: MEDICARE

## 2017-12-26 VITALS
DIASTOLIC BLOOD PRESSURE: 68 MMHG | BODY MASS INDEX: 25.33 KG/M2 | SYSTOLIC BLOOD PRESSURE: 132 MMHG | RESPIRATION RATE: 16 BRPM | WEIGHT: 152 LBS | HEIGHT: 65 IN | HEART RATE: 80 BPM

## 2017-12-26 DIAGNOSIS — R00.0 TACHYCARDIA: Primary | ICD-10-CM

## 2017-12-26 DIAGNOSIS — I10 ESSENTIAL HYPERTENSION: ICD-10-CM

## 2017-12-26 PROCEDURE — G8419 CALC BMI OUT NRM PARAM NOF/U: HCPCS | Performed by: INTERNAL MEDICINE

## 2017-12-26 PROCEDURE — 1036F TOBACCO NON-USER: CPT | Performed by: INTERNAL MEDICINE

## 2017-12-26 PROCEDURE — 4040F PNEUMOC VAC/ADMIN/RCVD: CPT | Performed by: INTERNAL MEDICINE

## 2017-12-26 PROCEDURE — G8484 FLU IMMUNIZE NO ADMIN: HCPCS | Performed by: INTERNAL MEDICINE

## 2017-12-26 PROCEDURE — 1090F PRES/ABSN URINE INCON ASSESS: CPT | Performed by: INTERNAL MEDICINE

## 2017-12-26 PROCEDURE — 99215 OFFICE O/P EST HI 40 MIN: CPT | Performed by: INTERNAL MEDICINE

## 2017-12-26 PROCEDURE — G8400 PT W/DXA NO RESULTS DOC: HCPCS | Performed by: INTERNAL MEDICINE

## 2017-12-26 PROCEDURE — 1123F ACP DISCUSS/DSCN MKR DOCD: CPT | Performed by: INTERNAL MEDICINE

## 2017-12-26 PROCEDURE — G8427 DOCREV CUR MEDS BY ELIG CLIN: HCPCS | Performed by: INTERNAL MEDICINE

## 2017-12-26 NOTE — PROGRESS NOTES
160-4.5 MCG/ACT AERO Inhale 2 puffs into the lungs 2 times daily      esomeprazole (NEXIUM) 40 MG capsule Take 1 capsule by mouth every morning (before breakfast) 90 capsule 3    Montelukast Sodium (SINGULAIR PO) Take 10 mg by mouth daily.  Theophylline (UNIPHYL PO) Take 600 mg by mouth daily.  Albuterol Sulfate (PROAIR HFA IN) Inhale  into the lungs as needed. No current facility-administered medications for this visit.       Allergies: Latex; Tramadol; Codeine; and Marcaine [bupivacaine hcl]  Past Medical History:   Diagnosis Date    Abdominal pain, other specified site     Asthma     Cataract     GERD (gastroesophageal reflux disease)     Glaucoma     Headache     Vitamin D deficiency      Past Surgical History:   Procedure Laterality Date    APPENDECTOMY      CATARACT REMOVAL      CERVICAL DISC SURGERY       SECTION      CHOLECYSTECTOMY      COLONOSCOPY  2009    Dr Arielle Simms    COLONOSCOPY  2012    Dr Travis Thomas ENDOSCOPY  2009    Dr Aria Girard ENDOSCOPY  2012    Dr Arielle Simms     Family History   Problem Relation Age of Onset    Cancer Sister      breast cancer    Heart Disease Brother     Heart Disease Brother     Colon Polyps Neg Hx     COPD Neg Hx     Esophageal Cancer Neg Hx     Liver Cancer Neg Hx     Liver Disease Neg Hx     Rectal Cancer Neg Hx     Stomach Cancer Neg Hx      Social History   Substance Use Topics    Smoking status: Never Smoker    Smokeless tobacco: Never Used    Alcohol use No          Review of Systems:    General:      Complaint / Symptom Yes / No / Description if Yes       Fatigue No   Weight gain No   Insomnia No       Respiratory:        Complaint / Symptom Yes / No / Description if Yes       Cough No   Horseness No       Cardiovascular:    Complaint / Symptom Yes / No / Description if Yes       Chest Pain No   Shortness of Air / Orthopnea No   Presyncope / Syncope No   Palpitations Yes: still having palpitations and patient c/o of high blood pressure         Objective:    /68   Pulse 80   Resp 16   Ht 5' 5\" (1.651 m)   Wt 152 lb (68.9 kg)   BMI 25.29 kg/m²     GENERAL - well developed and well nourished, conversant  HEENT   PERRLA, Hearing appears normal  NECK - no thyromegaly, no JVD, trachea is in the midline  CARDIOVASCULAR  PMI is in the mid line clavicular position, Normal S1 and S2 with a grade 1/6 systolic murmur. No S3 or S4    PULMONARY  no respiratory distress. No wheezes or rales. Lungs are clear to ausculation   ABDOMEN   soft, non tender, no rebound  MUSCULOSKELETAL   range of motion of the upper and lower extermites appears normal and equal and is without pain   EXTREMITIES - no significant edema   NEUROLOGIC  gait and station are normal  SKIN - turgor is normal  PSYCHIATRIC - normal mood and affect, alert and orientated x 3,      ASSESSMENT:    ALL THE CARDIOLOGY PROBLEMS ARE LISTED ABOVE; HOWEVER, THE FOLLOWING SPECIFIC CARDIAC PROBLEMS / CONDITIONS WERE ADDRESSED AND TREATED DURING THE OFFICE VISIT TODAY:                                                                                            MEDICAL DECISION MAKING             Cardiac Specific Problem / Diagnosis  Discussion and Data Reviewed Diagnostic Procedures Ordered Management Options Selected           1. Tachycardia  show no change   Review and summation of old records:    3/7/14 DSE negative for myocardial ischemia  3/17/14 Echo normal LVFX, EF 50%  12/18/17 Echo normal LVFX, EF 55-60%  12/18/17 Cynthia negative for myocardial ischemia EF 64%  12/26/17 Zio NSR    Discussed with patient the possibility of placing a loop recorder. Advised patient all workup has been negative but it could be possible that it was just not picked up on zio. Gave the option of placing the loop recorder or just monitoring the patient.

## 2017-12-28 ENCOUNTER — SURG/PROC ORDERS (OUTPATIENT)
Dept: CARDIOLOGY | Age: 77
End: 2017-12-28

## 2017-12-28 DIAGNOSIS — R00.2 PALPITATIONS: ICD-10-CM

## 2017-12-28 DIAGNOSIS — R00.0 TACHYCARDIA: Primary | ICD-10-CM

## 2017-12-28 RX ORDER — CHLORHEXIDINE GLUCONATE 4 G/100ML
SOLUTION TOPICAL ONCE
Status: CANCELLED | OUTPATIENT
Start: 2017-12-28 | End: 2017-12-28

## 2017-12-28 RX ORDER — SODIUM CHLORIDE 9 MG/ML
INJECTION, SOLUTION INTRAVENOUS CONTINUOUS
Status: CANCELLED | OUTPATIENT
Start: 2017-12-28

## 2018-01-04 ENCOUNTER — HOSPITAL ENCOUNTER (OUTPATIENT)
Dept: CARDIAC CATH/INVASIVE PROCEDURES | Age: 78
Discharge: HOME | End: 2018-01-04
Attending: INTERNAL MEDICINE | Admitting: INTERNAL MEDICINE
Payer: MEDICARE

## 2018-01-04 VITALS
TEMPERATURE: 97.7 F | HEART RATE: 77 BPM | WEIGHT: 152 LBS | RESPIRATION RATE: 13 BRPM | BODY MASS INDEX: 25.33 KG/M2 | DIASTOLIC BLOOD PRESSURE: 51 MMHG | SYSTOLIC BLOOD PRESSURE: 122 MMHG | OXYGEN SATURATION: 97 % | HEIGHT: 65 IN

## 2018-01-04 DIAGNOSIS — R00.2 PALPITATIONS: ICD-10-CM

## 2018-01-04 DIAGNOSIS — R00.0 TACHYCARDIA: ICD-10-CM

## 2018-01-04 PROBLEM — Z95.818 STATUS POST PLACEMENT OF IMPLANTABLE LOOP RECORDER: Status: ACTIVE | Noted: 2018-01-04

## 2018-01-04 PROBLEM — R55 SYNCOPE: Status: ACTIVE | Noted: 2018-01-04

## 2018-01-04 LAB
EKG P AXIS: 63 DEGREES
EKG P-R INTERVAL: 138 MS
EKG Q-T INTERVAL: 424 MS
EKG QRS DURATION: 82 MS
EKG QTC CALCULATION (BAZETT): 429 MS
EKG T AXIS: 47 DEGREES

## 2018-01-04 PROCEDURE — 2500000003 HC RX 250 WO HCPCS

## 2018-01-04 PROCEDURE — 33282 PR IMPLANTATION PT-ACTIVATED CARDIAC EVENT RECORDER: CPT | Performed by: INTERNAL MEDICINE

## 2018-01-04 PROCEDURE — 33282 HC IMPANTABLE LOOP RECORDER: CPT | Performed by: INTERNAL MEDICINE

## 2018-01-04 PROCEDURE — 6360000002 HC RX W HCPCS

## 2018-01-04 PROCEDURE — 6370000000 HC RX 637 (ALT 250 FOR IP): Performed by: INTERNAL MEDICINE

## 2018-01-04 PROCEDURE — 93005 ELECTROCARDIOGRAM TRACING: CPT

## 2018-01-04 PROCEDURE — 99024 POSTOP FOLLOW-UP VISIT: CPT | Performed by: INTERNAL MEDICINE

## 2018-01-04 PROCEDURE — A4364 ADHESIVE, LIQUID OR EQUAL: HCPCS

## 2018-01-04 PROCEDURE — C1764 EVENT RECORDER, CARDIAC: HCPCS

## 2018-01-04 PROCEDURE — 2580000003 HC RX 258: Performed by: INTERNAL MEDICINE

## 2018-01-04 PROCEDURE — 6360000002 HC RX W HCPCS: Performed by: INTERNAL MEDICINE

## 2018-01-04 RX ORDER — SODIUM CHLORIDE 9 MG/ML
INJECTION, SOLUTION INTRAVENOUS CONTINUOUS
Status: DISCONTINUED | OUTPATIENT
Start: 2018-01-04 | End: 2018-01-04 | Stop reason: HOSPADM

## 2018-01-04 RX ORDER — ONDANSETRON 2 MG/ML
4 INJECTION INTRAMUSCULAR; INTRAVENOUS EVERY 6 HOURS PRN
Status: DISCONTINUED | OUTPATIENT
Start: 2018-01-04 | End: 2018-01-04 | Stop reason: HOSPADM

## 2018-01-04 RX ORDER — ACETAMINOPHEN 325 MG/1
650 TABLET ORAL EVERY 4 HOURS PRN
Status: DISCONTINUED | OUTPATIENT
Start: 2018-01-04 | End: 2018-01-04 | Stop reason: HOSPADM

## 2018-01-04 RX ORDER — CHLORHEXIDINE GLUCONATE 4 G/100ML
SOLUTION TOPICAL ONCE
Status: COMPLETED | OUTPATIENT
Start: 2018-01-04 | End: 2018-01-04

## 2018-01-04 RX ADMIN — SODIUM CHLORIDE: 9 INJECTION, SOLUTION INTRAVENOUS at 07:53

## 2018-01-04 RX ADMIN — CHLORHEXIDINE GLUCONATE: 213 SOLUTION TOPICAL at 07:53

## 2018-01-04 RX ADMIN — CEFAZOLIN 2 G: 1 INJECTION, POWDER, FOR SOLUTION INTRAMUSCULAR; INTRAVENOUS; PARENTERAL at 08:00

## 2018-01-04 NOTE — PROCEDURES
Prior Cardiac History - 3/7/14 DSE negative for myocardial ischemia  3/17/14 Echo normal LVFX, EF 50%  12/18/17 Echo normal LVFX, EF 55-60%  12/18/17 Cynthia negative for myocardial ischemia EF 64%  1/4/18  Reveal implanted      Indications -  Worrisome palpitations and presyncope    After obtaining informed consent, the patient was brought to the catheterization laboratory where the left chest was prepared in the usual sterile fashion. The 's hands were scrubbed in a betadine solution for 5 minutes. Utilizing local anesthesia and a percutaneous technique, a single puncture was made in the left chest.    The Implantable Cardiac Monitor was then inserted and a sterile dressing applied. The patient was taken to her room in stable and satisfactory condition. No apparent complications. Technical Information:    The generator is a [employer]onic Implantable Cardiac Monitor (Reveal LINQ):  Model #:  [id number], Serial #:  [id number]. IMPRESSION:  Successful Implantable of a Implantable Cardiac Monitor (Reveal LINQ) for palpitations and presyncope.     Electronically signed by Negar Khan MD on 1/4/18

## 2018-01-11 NOTE — H&P
MG tablet Take 1 tablet by mouth nightly 30 tablet 5    levothyroxine (SYNTHROID) 50 MCG tablet Take 50 mcg by mouth Daily        ergocalciferol (ERGOCALCIFEROL) 48599 UNITS capsule Take 50,000 Units by mouth once a week        Probiotic Product (PROBIOTIC ADVANCED PO) Take by mouth        budesonide-formoterol (SYMBICORT) 160-4.5 MCG/ACT AERO Inhale 2 puffs into the lungs 2 times daily        esomeprazole (NEXIUM) 40 MG capsule Take 1 capsule by mouth every morning (before breakfast) 90 capsule 3    Montelukast Sodium (SINGULAIR PO) Take 10 mg by mouth daily.        Theophylline (UNIPHYL PO) Take 600 mg by mouth daily.        Albuterol Sulfate (PROAIR HFA IN) Inhale  into the lungs as needed.          No current facility-administered medications for this visit.          Allergies: Latex; Tramadol; Codeine; and Marcaine [bupivacaine hcl]  Past Medical History        Past Medical History:   Diagnosis Date    Abdominal pain, other specified site      Asthma      Cataract      GERD (gastroesophageal reflux disease)      Glaucoma      Headache      Vitamin D deficiency           Past Surgical History         Past Surgical History:   Procedure Laterality Date    APPENDECTOMY        CATARACT REMOVAL        CERVICAL DISC SURGERY         SECTION        CHOLECYSTECTOMY        COLONOSCOPY   2009     Dr Marcio Blackwood    COLONOSCOPY   2012     Dr Marcio Blackwood    [place]        TONSILLECTOMY        UPPER GASTROINTESTINAL ENDOSCOPY   2009     Dr Ericka Sanon   2012     Dr Marcio Blackwood         Family History          Family History   Problem Relation Age of Onset    Cancer Sister         breast cancer    Heart Disease Brother      Heart Disease Brother      Colon Polyps Neg Hx      COPD Neg Hx      Esophageal Cancer Neg Hx      Liver Cancer Neg Hx      Liver Disease Neg Hx      Rectal Cancer Neg Hx      Stomach     1. Tachycardia  show no change Review and summation of old records:     3/7/14 DSE negative for myocardial ischemia  3/17/14 Echo normal LVFX, EF 50%  12/18/17 Echo normal LVFX, EF 55-60%  12/18/17 Cynthia negative for myocardial ischemia EF 64%  12/26/17 Zio NSR     Discussed with patient the possibility of placing a loop recorder. Advised patient all workup has been negative but it could be possible that it was just not picked up on zio. Gave the option of placing the loop recorder or just monitoring the patient. Will schedule loop recorder placement for Thursday 1/4/17 @ 730 No Continue current medications:      Yes                 2. HTN  show no change Patient has a history of these risk factors, which are managed medically, and are on current oral therapy I personally addressed, counselled and educated the patient on this problem / risk factor. I will personally continue and manage prescribed medications and monitor the course of the therapy.       No Continue current medications:     {Yes                 3.    show no change   NA Continue current medications:        NA            Discussed with patient and family.     Follow Up Visit Scheduled for:  6 month(s)     I greatly appreciate the opportunity to meet Jean-Pierre Rene and your confidence in allowing me to participate in her cardiovascular care.  [place] [address] [facility] [address] [place] [address], Júnior Lerner MA am scribing for and in the presence of BLADIMIR Olivera MD,[facility].     Júnior Lerner MA      2:54 PM  I, BLADIMIR Olivera MD, [facility], personally performed the services described in this documentation as scribed by Júnior Lerner in my presence, and it is both accurate and complete.     Electronically signed by BLADIMIR Olivera MD, [facility]     12/26/17 3:20 PM             1/11/2018       Proposed Procedure:  Implantation of Loop Recorder Recorder    :  BLADIMIR Olivera MD    Indications:  giddiness and tachycardia    I have

## 2018-01-24 ENCOUNTER — OFFICE VISIT (OUTPATIENT)
Dept: CARDIOLOGY | Age: 78
End: 2018-01-24
Payer: MEDICARE

## 2018-01-24 VITALS
BODY MASS INDEX: 23.04 KG/M2 | HEART RATE: 72 BPM | SYSTOLIC BLOOD PRESSURE: 120 MMHG | DIASTOLIC BLOOD PRESSURE: 62 MMHG | WEIGHT: 152 LBS | HEIGHT: 68 IN

## 2018-01-24 DIAGNOSIS — Z95.818 STATUS POST PLACEMENT OF IMPLANTABLE LOOP RECORDER: ICD-10-CM

## 2018-01-24 DIAGNOSIS — I10 ESSENTIAL HYPERTENSION: ICD-10-CM

## 2018-01-24 DIAGNOSIS — R55 SYNCOPE, UNSPECIFIED SYNCOPE TYPE: ICD-10-CM

## 2018-01-24 DIAGNOSIS — R00.0 TACHYCARDIA: Primary | ICD-10-CM

## 2018-01-24 PROCEDURE — 93291 INTERROG DEV EVAL SCRMS IP: CPT | Performed by: NURSE PRACTITIONER

## 2018-01-24 PROCEDURE — G8427 DOCREV CUR MEDS BY ELIG CLIN: HCPCS | Performed by: NURSE PRACTITIONER

## 2018-01-24 PROCEDURE — G8484 FLU IMMUNIZE NO ADMIN: HCPCS | Performed by: NURSE PRACTITIONER

## 2018-01-24 PROCEDURE — 1123F ACP DISCUSS/DSCN MKR DOCD: CPT | Performed by: NURSE PRACTITIONER

## 2018-01-24 PROCEDURE — 99213 OFFICE O/P EST LOW 20 MIN: CPT | Performed by: NURSE PRACTITIONER

## 2018-01-24 PROCEDURE — G8400 PT W/DXA NO RESULTS DOC: HCPCS | Performed by: NURSE PRACTITIONER

## 2018-01-24 PROCEDURE — 4040F PNEUMOC VAC/ADMIN/RCVD: CPT | Performed by: NURSE PRACTITIONER

## 2018-01-24 PROCEDURE — 1036F TOBACCO NON-USER: CPT | Performed by: NURSE PRACTITIONER

## 2018-01-24 PROCEDURE — G8420 CALC BMI NORM PARAMETERS: HCPCS | Performed by: NURSE PRACTITIONER

## 2018-01-24 PROCEDURE — 1090F PRES/ABSN URINE INCON ASSESS: CPT | Performed by: NURSE PRACTITIONER

## 2018-01-24 NOTE — PROGRESS NOTES
placement of implantable loop recorder Z95.818    Syncope R55    Postural dizziness with presyncope R42, R55     Past Medical History:   Diagnosis Date    Abdominal pain, other specified site     Asthma     Cataract     GERD (gastroesophageal reflux disease)     Glaucoma     Headache     Status post placement of implantable loop recorder 2018    Vitamin D deficiency      Past Surgical History:   Procedure Laterality Date    APPENDECTOMY      CATARACT REMOVAL      CERVICAL DISC SURGERY       SECTION      CHOLECYSTECTOMY      COLONOSCOPY  2009    Dr Jeet Mccauley    COLONOSCOPY  2012    Dr Briana Garvey UPPER GASTROINTESTINAL ENDOSCOPY  2009    Dr Lizbet Alberts ENDOSCOPY  2012    Dr Jeet Mccauley     Family History   Problem Relation Age of Onset    Cancer Sister      breast cancer    Heart Disease Brother     Heart Disease Brother     Colon Polyps Neg Hx     COPD Neg Hx     Esophageal Cancer Neg Hx     Liver Cancer Neg Hx     Liver Disease Neg Hx     Rectal Cancer Neg Hx     Stomach Cancer Neg Hx      Social History   Substance Use Topics    Smoking status: Never Smoker    Smokeless tobacco: Never Used    Alcohol use No      Current Outpatient Prescriptions   Medication Sig Dispense Refill    Melatonin 1 MG CAPS Take 1 mg by mouth as needed      atenolol (TENORMIN) 25 MG tablet Take 1 tablet by mouth nightly 30 tablet 5    levothyroxine (SYNTHROID) 50 MCG tablet Take 50 mcg by mouth Daily      ergocalciferol (ERGOCALCIFEROL) 96361 UNITS capsule Take 50,000 Units by mouth once a week      Probiotic Product (PROBIOTIC ADVANCED PO) Take by mouth      budesonide-formoterol (SYMBICORT) 160-4.5 MCG/ACT AERO Inhale 2 puffs into the lungs 2 times daily      esomeprazole (NEXIUM) 40 MG capsule Take 1 capsule by mouth every morning (before breakfast) 90 capsule 3    Montelukast lb (68.9 kg)   01/04/18 152 lb (68.9 kg)   12/26/17 152 lb (68.9 kg)       Recent Results (from the past 1008 hour(s))   EKG 12 lead    Collection Time: 01/04/18  6:30 AM   Result Value Ref Range    P-R Interval 138 ms    QRS Duration 82 ms    Q-T Interval 424 ms    QTc Calculation (Bazett) 429 ms    P Axis 63 degrees    T Axis 47 degrees     Plan  Previous cardiac history and records reviewed. Continue current medications as prescribed. Continue to follow up with primary care provider for non cardiac medical problems. Call the office with any problems, questions or concerns at [phone]. Follow up as scheduled with your cardiologist - as scheduled. The following educational material has been included in this after visit summary for your review: heart health.     Additional instructions:  Coronary artery disease risk factors you can control: Smoking, high blood pressure, high cholesterol, diabetes, being overweight, lack of exercise and stress. Continue heart healthy diet. Take medications as directed. Exercise as tolerated. Strive for 15 minutes of exercise most days of the week. If asked to keep a blood pressure log, do so for 2 weeks. Call the office to report readings at [phone]. Blood pressure goal is 140/90 or less. If you are a diabetic, the goal is 130/80 or less. If you are taking cholesterol lowering medications, it is recommended that lab work be checked annually. Always keep a current medication list. Bring your medications to every office visit.       KRISTIAN Walls

## 2018-01-24 NOTE — PATIENT INSTRUCTIONS
important step you can take to protect your heart. It is never too late to quit. You will get health benefits right away. ? · Limit alcohol to 2 drinks a day for men and 1 drink a day for women. Too much alcohol can cause health problems. Medicines  ? · Take your medicines exactly as prescribed. Call your doctor if you think you are having a problem with your medicine. ? · If your doctor recommends aspirin, take the amount directed each day. Make sure you take aspirin and not another kind of pain reliever, such as acetaminophen (Tylenol). If you take ibuprofen (such as Advil or Motrin) for other problems, take aspirin at least 2 hours before taking ibuprofen. When should you call for help? Call 911 if you have symptoms of a heart attack. These may include:  ? · Chest pain or pressure, or a strange feeling in the chest.   ? · Sweating. ? · Shortness of breath. ? · Pain, pressure, or a strange feeling in the back, neck, jaw, or upper belly or in one or both shoulders or arms. ? · Lightheadedness or sudden weakness. ? · A fast or irregular heartbeat. ? After you call 911, the  may tell you to chew 1 adult-strength or 2 to 4 low-dose aspirin. Wait for an ambulance. Do not try to drive yourself. ? Watch closely for changes in your health, and be sure to contact your doctor if you have any problems. Where can you learn more? Go to https://A2B.Memvu. org and sign in to your hint account. Enter M039 in the [facility] box to learn more about \"A Healthy Heart: Care Instructions. \"     If you do not have an account, please click on the \"Sign Up Now\" link. Current as of: September 21, 2016  Content Version: 11.5  © 6102-3572 Healthwise, [employer]. Care instructions adapted under license by [facility] [facility] ([facility]).  If you have questions about a medical condition or this instruction, always ask your healthcare professional. Noel Carlisle disclaims any warranty or liability for your use of this information.

## 2018-02-26 DIAGNOSIS — R55 SYNCOPE, UNSPECIFIED SYNCOPE TYPE: ICD-10-CM

## 2018-02-26 DIAGNOSIS — Z95.818 STATUS POST PLACEMENT OF IMPLANTABLE LOOP RECORDER: Primary | ICD-10-CM

## 2018-02-26 PROCEDURE — 93299 PR REM INTERROG ICPMS/SCRMS <30 D TECH REVIEW: CPT | Performed by: NURSE PRACTITIONER

## 2018-02-26 PROCEDURE — 93298 REM INTERROG DEV EVAL SCRMS: CPT | Performed by: NURSE PRACTITIONER

## 2018-03-07 ENCOUNTER — TELEPHONE (OUTPATIENT)
Dept: CARDIOLOGY | Age: 78
End: 2018-03-07

## 2018-03-20 DIAGNOSIS — Z95.818 STATUS POST PLACEMENT OF IMPLANTABLE LOOP RECORDER: Primary | ICD-10-CM

## 2018-03-20 DIAGNOSIS — R42 POSTURAL DIZZINESS WITH PRESYNCOPE: ICD-10-CM

## 2018-03-20 DIAGNOSIS — R55 POSTURAL DIZZINESS WITH PRESYNCOPE: ICD-10-CM

## 2018-04-09 DIAGNOSIS — R55 SYNCOPE, UNSPECIFIED SYNCOPE TYPE: ICD-10-CM

## 2018-04-09 DIAGNOSIS — Z95.818 STATUS POST PLACEMENT OF IMPLANTABLE LOOP RECORDER: Primary | ICD-10-CM

## 2018-04-09 PROCEDURE — 93298 REM INTERROG DEV EVAL SCRMS: CPT | Performed by: NURSE PRACTITIONER

## 2018-04-09 PROCEDURE — 93299 PR REM INTERROG ICPMS/SCRMS <30 D TECH REVIEW: CPT | Performed by: NURSE PRACTITIONER

## 2018-04-30 ENCOUNTER — TELEPHONE (OUTPATIENT)
Dept: CARDIOLOGY | Age: 78
End: 2018-04-30

## 2018-05-21 DIAGNOSIS — R55 SYNCOPE, UNSPECIFIED SYNCOPE TYPE: ICD-10-CM

## 2018-05-21 DIAGNOSIS — Z95.818 STATUS POST PLACEMENT OF IMPLANTABLE LOOP RECORDER: Primary | ICD-10-CM

## 2018-05-21 PROCEDURE — 93299 PR REM INTERROG ICPMS/SCRMS <30 D TECH REVIEW: CPT | Performed by: NURSE PRACTITIONER

## 2018-05-21 PROCEDURE — 93298 REM INTERROG DEV EVAL SCRMS: CPT | Performed by: NURSE PRACTITIONER

## 2018-05-24 ENCOUNTER — TELEPHONE (OUTPATIENT)
Dept: CARDIOLOGY | Age: 78
End: 2018-05-24

## 2018-07-03 ENCOUNTER — OFFICE VISIT (OUTPATIENT)
Dept: CARDIOLOGY | Age: 78
End: 2018-07-03
Payer: MEDICARE

## 2018-07-03 VITALS
WEIGHT: 159 LBS | BODY MASS INDEX: 26.49 KG/M2 | DIASTOLIC BLOOD PRESSURE: 70 MMHG | HEART RATE: 80 BPM | HEIGHT: 65 IN | SYSTOLIC BLOOD PRESSURE: 132 MMHG

## 2018-07-03 DIAGNOSIS — R00.0 TACHYCARDIA: Primary | ICD-10-CM

## 2018-07-03 DIAGNOSIS — R55 SYNCOPE, UNSPECIFIED SYNCOPE TYPE: ICD-10-CM

## 2018-07-03 DIAGNOSIS — I10 ESSENTIAL HYPERTENSION: ICD-10-CM

## 2018-07-03 DIAGNOSIS — Z95.818 STATUS POST PLACEMENT OF IMPLANTABLE LOOP RECORDER: ICD-10-CM

## 2018-07-03 DIAGNOSIS — I34.0 MILD MITRAL REGURGITATION: ICD-10-CM

## 2018-07-03 PROCEDURE — G8419 CALC BMI OUT NRM PARAM NOF/U: HCPCS | Performed by: NURSE PRACTITIONER

## 2018-07-03 PROCEDURE — 99214 OFFICE O/P EST MOD 30 MIN: CPT | Performed by: NURSE PRACTITIONER

## 2018-07-03 PROCEDURE — G8427 DOCREV CUR MEDS BY ELIG CLIN: HCPCS | Performed by: NURSE PRACTITIONER

## 2018-07-03 PROCEDURE — 93291 INTERROG DEV EVAL SCRMS IP: CPT | Performed by: NURSE PRACTITIONER

## 2018-07-03 PROCEDURE — 1090F PRES/ABSN URINE INCON ASSESS: CPT | Performed by: NURSE PRACTITIONER

## 2018-07-03 RX ORDER — ESCITALOPRAM OXALATE 10 MG/1
10 TABLET ORAL DAILY
COMMUNITY
Start: 2018-04-25 | End: 2021-04-20

## 2018-07-05 NOTE — PROGRESS NOTES
122/51    Pulse Readings from Last 3 Encounters:   07/03/18 80   01/24/18 72   01/04/18 77        Wt Readings from Last 3 Encounters:   07/03/18 159 lb (72.1 kg)   01/24/18 152 lb (68.9 kg)   01/04/18 152 lb (68.9 kg)     Plan  Previous cardiac history and records reviewed. Decrease the atenolol to 25 mg 1/2 tab once daily due to low blood pressure. Continue current medications as prescribed. Continue to follow up with primary care provider for non cardiac medical problems. Call the office with any problems, questions or concerns at [phone]. Follow up as scheduled with your cardiologist - Dr. Shelia Bernal 6 months. The following educational material has been included in this after visit summary for your review: heart health.     Additional instructions:  Coronary artery disease risk factors you can control: Smoking, high blood pressure, high cholesterol, diabetes, being overweight, lack of exercise and stress. Continue heart healthy diet. Take medications as directed. Exercise as tolerated. Strive for 15 minutes of exercise most days of the week. If asked to keep a blood pressure log, do so for 2 weeks. Call the office to report readings at [phone]. Blood pressure goal is 140/90 or less. If you are a diabetic, the goal is 130/80 or less. If you are taking cholesterol lowering medications, it is recommended that lab work be checked annually. Always keep a current medication list. Bring your medications to every office visit.       JosueKRISTIAN Dwyer

## 2018-08-14 DIAGNOSIS — R55 POSTURAL DIZZINESS WITH PRESYNCOPE: ICD-10-CM

## 2018-08-14 DIAGNOSIS — Z95.818 STATUS POST PLACEMENT OF IMPLANTABLE LOOP RECORDER: Primary | ICD-10-CM

## 2018-08-14 DIAGNOSIS — R42 POSTURAL DIZZINESS WITH PRESYNCOPE: ICD-10-CM

## 2018-08-14 PROCEDURE — 93299 PR REM INTERROG ICPMS/SCRMS <30 D TECH REVIEW: CPT | Performed by: NURSE PRACTITIONER

## 2018-08-14 PROCEDURE — 93298 REM INTERROG DEV EVAL SCRMS: CPT | Performed by: NURSE PRACTITIONER

## 2018-09-25 DIAGNOSIS — Z95.818 STATUS POST PLACEMENT OF IMPLANTABLE LOOP RECORDER: Primary | ICD-10-CM

## 2018-09-25 DIAGNOSIS — R55 SYNCOPE, UNSPECIFIED SYNCOPE TYPE: ICD-10-CM

## 2018-09-25 PROCEDURE — 93299 PR REM INTERROG ICPMS/SCRMS <30 D TECH REVIEW: CPT | Performed by: NURSE PRACTITIONER

## 2018-09-25 PROCEDURE — 93298 REM INTERROG DEV EVAL SCRMS: CPT | Performed by: NURSE PRACTITIONER

## 2018-11-06 DIAGNOSIS — R42 POSTURAL DIZZINESS WITH PRESYNCOPE: ICD-10-CM

## 2018-11-06 DIAGNOSIS — R55 SYNCOPE, UNSPECIFIED SYNCOPE TYPE: ICD-10-CM

## 2018-11-06 DIAGNOSIS — Z95.818 STATUS POST PLACEMENT OF IMPLANTABLE LOOP RECORDER: Primary | ICD-10-CM

## 2018-11-06 DIAGNOSIS — R55 POSTURAL DIZZINESS WITH PRESYNCOPE: ICD-10-CM

## 2018-11-06 PROCEDURE — 93298 REM INTERROG DEV EVAL SCRMS: CPT | Performed by: CLINICAL NURSE SPECIALIST

## 2018-11-06 PROCEDURE — 93299 PR REM INTERROG ICPMS/SCRMS <30 D TECH REVIEW: CPT | Performed by: CLINICAL NURSE SPECIALIST

## 2018-11-28 DIAGNOSIS — R55 SYNCOPE, UNSPECIFIED SYNCOPE TYPE: ICD-10-CM

## 2018-11-28 DIAGNOSIS — Z95.818 STATUS POST PLACEMENT OF IMPLANTABLE LOOP RECORDER: Primary | ICD-10-CM

## 2018-12-18 DIAGNOSIS — Z95.818 STATUS POST PLACEMENT OF IMPLANTABLE LOOP RECORDER: Primary | ICD-10-CM

## 2018-12-18 DIAGNOSIS — R55 SYNCOPE, UNSPECIFIED SYNCOPE TYPE: ICD-10-CM

## 2018-12-18 PROCEDURE — 93299 PR REM INTERROG ICPMS/SCRMS <30 D TECH REVIEW: CPT | Performed by: CLINICAL NURSE SPECIALIST

## 2018-12-18 PROCEDURE — 93298 REM INTERROG DEV EVAL SCRMS: CPT | Performed by: CLINICAL NURSE SPECIALIST

## 2019-01-29 DIAGNOSIS — R55 SYNCOPE, UNSPECIFIED SYNCOPE TYPE: ICD-10-CM

## 2019-01-29 DIAGNOSIS — Z95.818 STATUS POST PLACEMENT OF IMPLANTABLE LOOP RECORDER: Primary | ICD-10-CM

## 2019-01-29 PROCEDURE — 93299 PR REM INTERROG ICPMS/SCRMS <30 D TECH REVIEW: CPT | Performed by: CLINICAL NURSE SPECIALIST

## 2019-01-29 PROCEDURE — 93298 REM INTERROG DEV EVAL SCRMS: CPT | Performed by: CLINICAL NURSE SPECIALIST

## 2019-02-26 ENCOUNTER — OFFICE VISIT (OUTPATIENT)
Dept: CARDIOLOGY | Age: 79
End: 2019-02-26
Payer: MEDICARE

## 2019-02-26 VITALS
DIASTOLIC BLOOD PRESSURE: 80 MMHG | BODY MASS INDEX: 27.49 KG/M2 | SYSTOLIC BLOOD PRESSURE: 138 MMHG | HEIGHT: 65 IN | WEIGHT: 165 LBS | HEART RATE: 60 BPM

## 2019-02-26 DIAGNOSIS — Z95.818 STATUS POST PLACEMENT OF IMPLANTABLE LOOP RECORDER: ICD-10-CM

## 2019-02-26 DIAGNOSIS — I10 ESSENTIAL HYPERTENSION: Primary | ICD-10-CM

## 2019-02-26 DIAGNOSIS — R55 SYNCOPE, UNSPECIFIED SYNCOPE TYPE: ICD-10-CM

## 2019-02-26 PROCEDURE — 1123F ACP DISCUSS/DSCN MKR DOCD: CPT | Performed by: INTERNAL MEDICINE

## 2019-02-26 PROCEDURE — 1090F PRES/ABSN URINE INCON ASSESS: CPT | Performed by: INTERNAL MEDICINE

## 2019-02-26 PROCEDURE — 1036F TOBACCO NON-USER: CPT | Performed by: INTERNAL MEDICINE

## 2019-02-26 PROCEDURE — G8400 PT W/DXA NO RESULTS DOC: HCPCS | Performed by: INTERNAL MEDICINE

## 2019-02-26 PROCEDURE — G8427 DOCREV CUR MEDS BY ELIG CLIN: HCPCS | Performed by: INTERNAL MEDICINE

## 2019-02-26 PROCEDURE — G8419 CALC BMI OUT NRM PARAM NOF/U: HCPCS | Performed by: INTERNAL MEDICINE

## 2019-02-26 PROCEDURE — 4040F PNEUMOC VAC/ADMIN/RCVD: CPT | Performed by: INTERNAL MEDICINE

## 2019-02-26 PROCEDURE — G8484 FLU IMMUNIZE NO ADMIN: HCPCS | Performed by: INTERNAL MEDICINE

## 2019-02-26 PROCEDURE — 1101F PT FALLS ASSESS-DOCD LE1/YR: CPT | Performed by: INTERNAL MEDICINE

## 2019-02-26 PROCEDURE — 93291 INTERROG DEV EVAL SCRMS IP: CPT | Performed by: INTERNAL MEDICINE

## 2019-02-26 PROCEDURE — 99212 OFFICE O/P EST SF 10 MIN: CPT | Performed by: INTERNAL MEDICINE

## 2019-02-26 RX ORDER — FLUTICASONE FUROATE AND VILANTEROL 100; 25 UG/1; UG/1
1 POWDER RESPIRATORY (INHALATION)
COMMUNITY
Start: 2019-01-17 | End: 2019-09-17

## 2019-02-26 RX ORDER — ESOMEPRAZOLE MAGNESIUM 40 MG/1
40 FOR SUSPENSION ORAL 2 TIMES DAILY
COMMUNITY

## 2019-04-09 DIAGNOSIS — Z95.818 STATUS POST PLACEMENT OF IMPLANTABLE LOOP RECORDER: Primary | ICD-10-CM

## 2019-04-09 DIAGNOSIS — R55 SYNCOPE, UNSPECIFIED SYNCOPE TYPE: ICD-10-CM

## 2019-04-09 DIAGNOSIS — R00.2 PALPITATIONS: ICD-10-CM

## 2019-04-09 PROCEDURE — 93298 REM INTERROG DEV EVAL SCRMS: CPT | Performed by: CLINICAL NURSE SPECIALIST

## 2019-04-09 PROCEDURE — 93299 PR REM INTERROG ICPMS/SCRMS <30 D TECH REVIEW: CPT | Performed by: CLINICAL NURSE SPECIALIST

## 2019-06-04 DIAGNOSIS — R00.2 PALPITATIONS: ICD-10-CM

## 2019-06-04 DIAGNOSIS — Z95.818 STATUS POST PLACEMENT OF IMPLANTABLE LOOP RECORDER: Primary | ICD-10-CM

## 2019-06-04 PROCEDURE — 93299 PR REM INTERROG ICPMS/SCRMS <30 D TECH REVIEW: CPT | Performed by: NURSE PRACTITIONER

## 2019-06-04 PROCEDURE — 93298 REM INTERROG DEV EVAL SCRMS: CPT | Performed by: NURSE PRACTITIONER

## 2019-06-05 ENCOUNTER — TELEPHONE (OUTPATIENT)
Dept: CARDIOLOGY | Age: 79
End: 2019-06-05

## 2019-07-09 DIAGNOSIS — R00.2 PALPITATIONS: ICD-10-CM

## 2019-07-09 DIAGNOSIS — Z95.818 STATUS POST PLACEMENT OF IMPLANTABLE LOOP RECORDER: Primary | ICD-10-CM

## 2019-07-09 PROCEDURE — 93298 REM INTERROG DEV EVAL SCRMS: CPT | Performed by: CLINICAL NURSE SPECIALIST

## 2019-07-09 PROCEDURE — 93299 PR REM INTERROG ICPMS/SCRMS <30 D TECH REVIEW: CPT | Performed by: CLINICAL NURSE SPECIALIST

## 2019-08-14 DIAGNOSIS — Z95.818 STATUS POST PLACEMENT OF IMPLANTABLE LOOP RECORDER: Primary | ICD-10-CM

## 2019-08-14 DIAGNOSIS — R00.2 PALPITATIONS: ICD-10-CM

## 2019-08-14 PROCEDURE — 93299 PR REM INTERROG ICPMS/SCRMS <30 D TECH REVIEW: CPT | Performed by: NURSE PRACTITIONER

## 2019-08-14 PROCEDURE — 93298 REM INTERROG DEV EVAL SCRMS: CPT | Performed by: NURSE PRACTITIONER

## 2019-09-17 ENCOUNTER — OFFICE VISIT (OUTPATIENT)
Dept: CARDIOLOGY | Age: 79
End: 2019-09-17
Payer: MEDICARE

## 2019-09-17 ENCOUNTER — HOSPITAL ENCOUNTER (OUTPATIENT)
Dept: NON INVASIVE DIAGNOSTICS | Age: 79
Discharge: HOME | End: 2019-09-17
Payer: MEDICARE

## 2019-09-17 VITALS
BODY MASS INDEX: 29.32 KG/M2 | HEART RATE: 78 BPM | HEIGHT: 65 IN | SYSTOLIC BLOOD PRESSURE: 112 MMHG | DIASTOLIC BLOOD PRESSURE: 60 MMHG | WEIGHT: 176 LBS

## 2019-09-17 DIAGNOSIS — R60.9 FLUID RETENTION: ICD-10-CM

## 2019-09-17 DIAGNOSIS — I34.0 MILD MITRAL REGURGITATION: ICD-10-CM

## 2019-09-17 DIAGNOSIS — I10 ESSENTIAL HYPERTENSION: ICD-10-CM

## 2019-09-17 DIAGNOSIS — R06.09 DOE (DYSPNEA ON EXERTION): ICD-10-CM

## 2019-09-17 DIAGNOSIS — R06.09 DOE (DYSPNEA ON EXERTION): Primary | ICD-10-CM

## 2019-09-17 DIAGNOSIS — Z95.818 STATUS POST PLACEMENT OF IMPLANTABLE LOOP RECORDER: ICD-10-CM

## 2019-09-17 DIAGNOSIS — R55 SYNCOPE, UNSPECIFIED SYNCOPE TYPE: ICD-10-CM

## 2019-09-17 LAB
ANION GAP SERPL CALCULATED.3IONS-SCNC: 11 MMOL/L (ref 7–19)
BUN BLDV-MCNC: 18 MG/DL (ref 8–23)
CALCIUM SERPL-MCNC: 9.3 MG/DL (ref 8.8–10.2)
CHLORIDE BLD-SCNC: 105 MMOL/L (ref 98–111)
CO2: 25 MMOL/L (ref 22–29)
CREAT SERPL-MCNC: 1 MG/DL (ref 0.5–0.9)
GFR NON-AFRICAN AMERICAN: 53
GLUCOSE BLD-MCNC: 110 MG/DL (ref 74–109)
HCT VFR BLD CALC: 35.4 % (ref 37–47)
HEMOGLOBIN: 11.3 G/DL (ref 12–16)
LV EF: 58 %
LVEF MODALITY: NORMAL
MCH RBC QN AUTO: 29 PG (ref 27–31)
MCHC RBC AUTO-ENTMCNC: 31.9 G/DL (ref 33–37)
MCV RBC AUTO: 91 FL (ref 81–99)
PDW BLD-RTO: 13 % (ref 11.5–14.5)
PLATELET # BLD: 249 K/UL (ref 130–400)
PMV BLD AUTO: 10.6 FL (ref 9.4–12.3)
POTASSIUM SERPL-SCNC: 3.9 MMOL/L (ref 3.5–5)
PRO-BNP: 129 PG/ML (ref 0–1800)
RBC # BLD: 3.89 M/UL (ref 4.2–5.4)
SODIUM BLD-SCNC: 141 MMOL/L (ref 136–145)
WBC # BLD: 4.8 K/UL (ref 4.8–10.8)

## 2019-09-17 PROCEDURE — G8419 CALC BMI OUT NRM PARAM NOF/U: HCPCS | Performed by: NURSE PRACTITIONER

## 2019-09-17 PROCEDURE — G8400 PT W/DXA NO RESULTS DOC: HCPCS | Performed by: NURSE PRACTITIONER

## 2019-09-17 PROCEDURE — G8427 DOCREV CUR MEDS BY ELIG CLIN: HCPCS | Performed by: NURSE PRACTITIONER

## 2019-09-17 PROCEDURE — 93306 TTE W/DOPPLER COMPLETE: CPT

## 2019-09-17 PROCEDURE — 1090F PRES/ABSN URINE INCON ASSESS: CPT | Performed by: NURSE PRACTITIONER

## 2019-09-17 PROCEDURE — 93291 INTERROG DEV EVAL SCRMS IP: CPT | Performed by: NURSE PRACTITIONER

## 2019-09-17 PROCEDURE — 1036F TOBACCO NON-USER: CPT | Performed by: NURSE PRACTITIONER

## 2019-09-17 PROCEDURE — 1123F ACP DISCUSS/DSCN MKR DOCD: CPT | Performed by: NURSE PRACTITIONER

## 2019-09-17 PROCEDURE — 99214 OFFICE O/P EST MOD 30 MIN: CPT | Performed by: NURSE PRACTITIONER

## 2019-09-17 PROCEDURE — 4040F PNEUMOC VAC/ADMIN/RCVD: CPT | Performed by: NURSE PRACTITIONER

## 2019-09-17 RX ORDER — FUROSEMIDE 40 MG/1
40 TABLET ORAL PRN
COMMUNITY
Start: 2018-12-27

## 2019-09-17 RX ORDER — FLUTICASONE FUROATE AND VILANTEROL 200; 25 UG/1; UG/1
1 POWDER RESPIRATORY (INHALATION) DAILY
COMMUNITY
Start: 2019-07-08 | End: 2021-02-23

## 2019-09-17 RX ORDER — HYDROCHLOROTHIAZIDE 12.5 MG/1
CAPSULE, GELATIN COATED ORAL
COMMUNITY
Start: 2017-08-30 | End: 2019-09-17

## 2019-09-17 RX ORDER — PANTOPRAZOLE SODIUM 40 MG/1
40 TABLET, DELAYED RELEASE ORAL DAILY
COMMUNITY
End: 2021-02-08 | Stop reason: ALTCHOICE

## 2019-09-17 NOTE — PROGRESS NOTES
40 MG PACK Take 40 mg by mouth 2 times daily      escitalopram (LEXAPRO) 10 MG tablet Take 10 mg by mouth      levothyroxine (SYNTHROID) 50 MCG tablet Take 50 mcg by mouth Daily      ergocalciferol (ERGOCALCIFEROL) 70727 UNITS capsule Take 50,000 Units by mouth once a week      Probiotic Product (PROBIOTIC ADVANCED PO) Take by mouth daily       Montelukast Sodium (SINGULAIR PO) Take 10 mg by mouth daily.  Albuterol Sulfate (PROAIR HFA IN) Inhale  into the lungs as needed. No current facility-administered medications for this visit. Allergies: Latex; Tramadol; Codeine; and Marcaine [bupivacaine hcl]    Review of Systems  Constitutional - no appetite change, or unexpected weight change. No fever, chills or diaphoresis. No significant change in activity level or new onset of fatigue. HEENT - no significant rhinorrhea or epistaxis. No tinnitus or significant hearing loss. Eyes - no sudden vision change or amaurosis. No corneal arcus, xantholasma, subconjunctival hemorrhage or discharge. Respiratory - no significant wheezing, stridor, apnea or cough.  + RANGEL for 2 months. Cardiovascular - no exertional chest pain to suggest myocardial ischemia. No orthopnea or PND. No sensation of sustained arrythmia. No occurrence of slow heart rate. No palpitations. No claudication. +flud retention abdomen. Gastrointestinal - no abdominal swelling or pain. No blood in stool. No severe constipation, diarrhea, nausea, or vomiting. Genitourinary - no dysuria, frequency, or urgency. No flank pain or hematuria. Musculoskeletal - no back pain or myalgia. No problems with gait. Extremities - no clubbing, cyanosis or extremity edema. Skin - no color change or rash. No pallor. No new surgical incision. Neurologic - no speech difficulty, facial asymmetry or lateralizing weakness. No seizures, presyncope or syncope. No significant dizziness. Hematologic - no easy bruising or excessive bleeding. Color   6. Fluid retention  Basic Metabolic Panel    Brain Natriuretic Peptide    CBC    ECHO Complete 2D W Doppler W Color     Data reviewed:  12/18/17 echo   Conclusions    Summary   Normal left ventricular size with preserved LV function and an estimated   ejection fraction of approximately 55-60%.   No evidence of left ventricular mass or thrombus noted.    Signature    ----------------------------------------------------------------   Electronically signed by Sotero Lizama MD(Interpreting   physician) on 12/18/2017 03:12 PM   ----------------------------------------------------------------    Findings    Mitral Valve   Structurally normal mitral valve with normal leaflet mobility. No evidence   of mitral valve stenosis, mild mitral regurgitation.      Aortic Valve   Mildly thickened aortic valve leaflets with preserved leaflet mobility.   Aortic valve appears to be tricuspid.      Tricuspid Valve   Mild tricuspid regurgitation.      Pulmonic Valve   Mild pulmonic regurgitation present.      Left Atrium   Mildly dilated left atrium.      Left Ventricle   Normal left ventricular size with preserved LV function and an estimated   ejection fraction of approximately 55-60%.   No evidence of left ventricular mass or thrombus noted.      Right Atrium   Mildly enlarged right atrium size.      Right Ventricle   Normal right ventricular size with preserved RV function.      Pericardial Effusion   No evidence of significant pericardial effusion is noted. 12/18/17 Lexiscan  Findings:   1. Analysis of the stress and rest images reveals no obvious areas of   ischemia or infarction. 2. Analysis of the gated images reveals grossly normal left   ventricular function with a calculated ejection fraction of 64 %.    Conclusions:   Grossly negative Cardiolyte for the presence of ischemia or infarction   with normal wall motion and ejection fraction at rest.   Signed by Dr Sotero Lizama on 12/18/2017 6:22 PM     Loop recorder check. Battery status good. No symptoms. No tachy, pause, juan or AT/AF. Loop recorder showed no arrhythmia, juan or pause. No additional syncope. HTN - good BP control. PCP follows lipids and thyroid function. Recent thyroid labs are normal.  Due to RANGEL and abdominal fluid retention - check 2D echo, CBC, BMP and BNP. Schedule follow up with Dr. Hosea Blanco in 6 weeks. Patient is compliant with medication regimen. BP Readings from Last 3 Encounters:   09/17/19 112/60   02/26/19 138/80   07/03/18 132/70    Pulse Readings from Last 3 Encounters:   09/17/19 78   02/26/19 60   07/03/18 80        Wt Readings from Last 3 Encounters:   09/17/19 176 lb (79.8 kg)   02/26/19 165 lb (74.8 kg)   07/03/18 159 lb (72.1 kg)     Plan  Previous cardiac history and records reviewed. Continue current medical management. Check 2D echo, CBC, BMP and BNP today. Continue other current medications as directed. Continue to follow up with primary care provider for non cardiac medical problems. Call the office with any problems, questions or concerns at [phone]. Cardiology follow up: Dr. Hosea Blanco 6 weeks. Educational included in patient instructions. Heart health.      KRISTIAN Forde

## 2019-09-17 NOTE — PATIENT INSTRUCTIONS
pressure goal is less than 130/80. 2) Control cholesterol - contributes to plaque, which can clog arteries and lead to heart disease and stroke. When you control your cholesterol you are giving your arteries their best chance to remain clear. It is recommended that you get cholesterol lab work done once a year. 3) Reduce blood sugar - most of the food we eat is turning into glucose or blood sugar that our body uses for energy. Over time, high levels of blood sugar can damage your heart, kidneys, eyes and nerves. 4) Get active - living an active life is one of the most rewarding gifts you can give yourself and those you love. Simply put, daily physical activity increases your length and quality of life. Strive to exercise 15 minutes most days of the week. 5)  Eat better - A healthy diet is one of your best weapons for fighting cardiovascular disease. When you eat a heart healthy diet, you improve your chances for feeling good and staying healthy for life. 6)  Lose weight - when you shed extra fat an unnecessary pounds, you reduce the burden on your hear, lungs, blood vessels and skeleton. You give yourself the gift of active living, you lower your blood pressure and help yourself feel better. 7) Stop smoking - cigarette smokers have a higher risk of developing cardiovascular disease. If  You smoke, quitting is the best thing you can do for your health. Check American Heart Association on line for more information on Life's Simple 7 and tips for healthy living. Patient Education        A Healthy Heart: Care Instructions  Your Care Instructions    Heart disease occurs when a substance called plaque builds up in the vessels that supply oxygen-rich blood to your heart. This can narrow the blood vessels and reduce blood flow. A heart attack happens when blood flow is completely blocked. A high-fat diet, smoking, and other factors increase the risk of heart disease.   Your doctor has found that you have

## 2019-10-18 DIAGNOSIS — Z95.818 STATUS POST PLACEMENT OF IMPLANTABLE LOOP RECORDER: Primary | ICD-10-CM

## 2019-10-18 DIAGNOSIS — R55 SYNCOPE, UNSPECIFIED SYNCOPE TYPE: ICD-10-CM

## 2019-11-05 ENCOUNTER — OFFICE VISIT (OUTPATIENT)
Dept: CARDIOLOGY | Age: 79
End: 2019-11-05
Payer: MEDICARE

## 2019-11-05 VITALS
DIASTOLIC BLOOD PRESSURE: 68 MMHG | HEIGHT: 65 IN | BODY MASS INDEX: 28.82 KG/M2 | WEIGHT: 173 LBS | SYSTOLIC BLOOD PRESSURE: 124 MMHG | HEART RATE: 72 BPM

## 2019-11-05 DIAGNOSIS — I10 ESSENTIAL HYPERTENSION: ICD-10-CM

## 2019-11-05 DIAGNOSIS — R60.9 FLUID RETENTION: ICD-10-CM

## 2019-11-05 DIAGNOSIS — R55 SYNCOPE, UNSPECIFIED SYNCOPE TYPE: Primary | ICD-10-CM

## 2019-11-05 PROCEDURE — G8427 DOCREV CUR MEDS BY ELIG CLIN: HCPCS | Performed by: INTERNAL MEDICINE

## 2019-11-05 PROCEDURE — G8400 PT W/DXA NO RESULTS DOC: HCPCS | Performed by: INTERNAL MEDICINE

## 2019-11-05 PROCEDURE — 1090F PRES/ABSN URINE INCON ASSESS: CPT | Performed by: INTERNAL MEDICINE

## 2019-11-05 PROCEDURE — 1123F ACP DISCUSS/DSCN MKR DOCD: CPT | Performed by: INTERNAL MEDICINE

## 2019-11-05 PROCEDURE — 1036F TOBACCO NON-USER: CPT | Performed by: INTERNAL MEDICINE

## 2019-11-05 PROCEDURE — 4040F PNEUMOC VAC/ADMIN/RCVD: CPT | Performed by: INTERNAL MEDICINE

## 2019-11-05 PROCEDURE — G8484 FLU IMMUNIZE NO ADMIN: HCPCS | Performed by: INTERNAL MEDICINE

## 2019-11-05 PROCEDURE — 99212 OFFICE O/P EST SF 10 MIN: CPT | Performed by: INTERNAL MEDICINE

## 2019-11-05 PROCEDURE — G8417 CALC BMI ABV UP PARAM F/U: HCPCS | Performed by: INTERNAL MEDICINE

## 2019-12-10 DIAGNOSIS — Z95.818 STATUS POST PLACEMENT OF IMPLANTABLE LOOP RECORDER: Primary | ICD-10-CM

## 2019-12-10 DIAGNOSIS — R00.2 PALPITATIONS: ICD-10-CM

## 2019-12-10 PROCEDURE — 93299 PR REM INTERROG ICPMS/SCRMS <30 D TECH REVIEW: CPT | Performed by: NURSE PRACTITIONER

## 2019-12-10 PROCEDURE — 93298 REM INTERROG DEV EVAL SCRMS: CPT | Performed by: NURSE PRACTITIONER

## 2020-01-15 PROCEDURE — 93298 REM INTERROG DEV EVAL SCRMS: CPT | Performed by: NURSE PRACTITIONER

## 2020-02-17 ENCOUNTER — TELEPHONE (OUTPATIENT)
Dept: CARDIOLOGY | Age: 80
End: 2020-02-17

## 2020-02-17 PROCEDURE — G2066 INTER DEVC REMOTE 30D: HCPCS | Performed by: NURSE PRACTITIONER

## 2020-02-17 PROCEDURE — 93297 REM INTERROG DEV EVAL ICPMS: CPT | Performed by: NURSE PRACTITIONER

## 2020-02-17 NOTE — TELEPHONE ENCOUNTER
Date of Surgery: 3-18-20    Cardiologist: Dr. Rakesh Díaz    Procedure: Right Shoulder rotator cuff repair    Surgeon: Dr. Oanh Granger    Last Office Visit: 11-15-19  Reason for office visit and medical concerns addressed at this office visit: HTN, Fluid retention, Syncope unspecified    Testing Performed and Date of Service:  Echo 9-17-19 Normal  [facility]  1-15-20    RCRI = 0 pt, low, 0.4%   METs 4    Current Medications: Spirva, Lasix, protonix, nexium, lexapro, synthroid, singulair, albuterol     Is the patient currently taking an anticoagulant? If so, what is the diagnosis the patient has been given to warrant the need for the anticoagulant?  No    Additional Notes: Cardiac Risk Request

## 2020-03-24 PROCEDURE — 93298 REM INTERROG DEV EVAL SCRMS: CPT | Performed by: NURSE PRACTITIONER

## 2020-03-24 PROCEDURE — G2066 INTER DEVC REMOTE 30D: HCPCS | Performed by: NURSE PRACTITIONER

## 2020-04-21 ENCOUNTER — TELEPHONE (OUTPATIENT)
Dept: CARDIOLOGY | Age: 80
End: 2020-04-21

## 2020-04-21 NOTE — TELEPHONE ENCOUNTER
No Answer; Unable to contact pt;     Pt may change appt to a VV; IF pt wants to change to VV; Please message the office    Pt may change to a phone visit;  If pt wants to change to a phone visit; Please message the office      If pt wants to keep his appt; Please update appt notes

## 2020-04-24 PROCEDURE — G2066 INTER DEVC REMOTE 30D: HCPCS | Performed by: CLINICAL NURSE SPECIALIST

## 2020-04-24 PROCEDURE — 93298 REM INTERROG DEV EVAL SCRMS: CPT | Performed by: CLINICAL NURSE SPECIALIST

## 2020-04-27 ENCOUNTER — VIRTUAL VISIT (OUTPATIENT)
Dept: CARDIOLOGY | Age: 80
End: 2020-04-27
Payer: MEDICARE

## 2020-04-27 VITALS — WEIGHT: 172 LBS | BODY MASS INDEX: 28.62 KG/M2

## 2020-04-27 PROCEDURE — G8427 DOCREV CUR MEDS BY ELIG CLIN: HCPCS | Performed by: NURSE PRACTITIONER

## 2020-04-27 PROCEDURE — 4040F PNEUMOC VAC/ADMIN/RCVD: CPT | Performed by: NURSE PRACTITIONER

## 2020-04-27 PROCEDURE — 1123F ACP DISCUSS/DSCN MKR DOCD: CPT | Performed by: NURSE PRACTITIONER

## 2020-04-27 PROCEDURE — 1090F PRES/ABSN URINE INCON ASSESS: CPT | Performed by: NURSE PRACTITIONER

## 2020-04-27 PROCEDURE — G8400 PT W/DXA NO RESULTS DOC: HCPCS | Performed by: NURSE PRACTITIONER

## 2020-04-27 PROCEDURE — 1036F TOBACCO NON-USER: CPT | Performed by: NURSE PRACTITIONER

## 2020-04-27 PROCEDURE — G8417 CALC BMI ABV UP PARAM F/U: HCPCS | Performed by: NURSE PRACTITIONER

## 2020-04-27 PROCEDURE — 99441 PR PHYS/QHP TELEPHONE EVALUATION 5-10 MIN: CPT | Performed by: NURSE PRACTITIONER

## 2020-04-27 RX ORDER — BUDESONIDE AND FORMOTEROL FUMARATE DIHYDRATE 160; 4.5 UG/1; UG/1
2 AEROSOL RESPIRATORY (INHALATION) DAILY
COMMUNITY
Start: 2020-02-08 | End: 2021-02-08 | Stop reason: ALTCHOICE

## 2020-04-27 NOTE — PATIENT INSTRUCTIONS
New instructions for today:    Patient Instructions:  Continue current medications as prescribed. Always keep a current medication list. Bring your medications to every office visit. Continue to follow up with primary care provider for non cardiac medical problems. Call the office with any problems, questions or concerns at [phone]. If you have been asked to keep a blood pressure log, do so for 2 weeks. Call the office to report readings to the triage nurse at [phone]. Follow up with cardiologist as scheduled. The following educational material has been included in this after visit summary for your review: Life simple 7. Heart health. Life simple 7  1) Manage blood pressure - high blood pressure is a major risk factor for heart disease and stroke. Keeping blood pressure in health range reduces strain on your heart, arteries and kidneys. Blood pressure goal is less than 130/80. 2) Control cholesterol - contributes to plaque, which can clog arteries and lead to heart disease and stroke. When you control your cholesterol you are giving your arteries their best chance to remain clear. It is recommended that you get cholesterol lab work done once a year. 3) Reduce blood sugar - most of the food we eat is turning into glucose or blood sugar that our body uses for energy. Over time, high levels of blood sugar can damage your heart, kidneys, eyes and nerves. 4) Get active - living an active life is one of the most rewarding gifts you can give yourself and those you love. Simply put, daily physical activity increases your length and quality of life. Strive to exercise 15 minutes most days of the week. 5)  Eat better - A healthy diet is one of your best weapons for fighting cardiovascular disease. When you eat a heart healthy diet, you improve your chances for feeling good and staying healthy for life.   6)  Lose weight - when you shed extra fat an unnecessary pounds, you reduce the burden on peanut, or canola oil when you cook. Bake, broil, and steam foods instead of frying them.     · Eat a variety of fruit and vegetables every day. Dark green, deep orange, red, or yellow fruits and vegetables are especially good for you. Examples include spinach, carrots, peaches, and berries.     · Foods high in fiber can reduce your cholesterol and provide important vitamins and minerals. High-fiber foods include whole-grain cereals and breads, oatmeal, beans, brown rice, citrus fruits, and apples.     · Eat lean proteins. Heart-healthy proteins include seafood, lean meats and poultry, eggs, beans, peas, nuts, seeds, and soy products.     · Limit drinks and foods with added sugar. These include candy, desserts, and soda pop.    Lifestyle changes    · If your doctor recommends it, get more exercise. Walking is a good choice. Bit by bit, increase the amount you walk every day. Try for at least 30 minutes on most days of the week. You also may want to swim, bike, or do other activities.     · Do not smoke. If you need help quitting, talk to your doctor about stop-smoking programs and medicines. These can increase your chances of quitting for good. Quitting smoking may be the most important step you can take to protect your heart. It is never too late to quit. You will get health benefits right away.     · Limit alcohol to 2 drinks a day for men and 1 drink a day for women. Too much alcohol can cause health problems. Medicines    · Take your medicines exactly as prescribed. Call your doctor if you think you are having a problem with your medicine.     · If your doctor recommends aspirin, take the amount directed each day. Make sure you take aspirin and not another kind of pain reliever, such as acetaminophen (Tylenol). If you take ibuprofen (such as Advil or Motrin) for other problems, take aspirin at least 2 hours before taking ibuprofen. When should you call for help?   Call 911 if you have symptoms of a heart

## 2020-04-27 NOTE — PROGRESS NOTES
Nathalie Blair is a [de-identified] y.o. female evaluated via telephone on 4/27/2020. Consent:  She and/or health care decision maker is aware that that she may receive a bill for this telephone service, depending on her insurance coverage, and has provided verbal consent to proceed: Yes    Documentation:  I communicated with the patient and/or health care decision maker about cardiac issues. Details of this discussion including any medical advice provided: heart health. I affirm this is a Patient Initiated Episode with an Established Patient who has not had a related appointment within my department in the past 7 days or scheduled within the next 24 hours. Total Time: minutes: 5-10 minutes    Note: not billable if this call serves to triage the patient into an appointment for the relevant concern    Meryle Lan      4/27/2020    Audio [facility] Encouter[facility])  The telephone encourter was conducted with patient in their residence from [address] with KRISTIAN Asif; assistance by Aaron Barreto MA.    HPI:  Nathalie Blair   Diagnosis Orders   1. Essential hypertension     2. Status post placement of implantable loop recorder     3. Mild mitral regurgitation     4. Tachycardia       The patient presents today for audio evaluation today. She is doing very well with no cardiac related issues. The patient denies symptoms to suggest myocardial ischemia, heart failure or arrhythmia. BP is well controlled on current regimen. The patient's PCP monitors cholesterol. Carelink transmission on loop recorder received on 4/24/20 showing adequate battery status with no rhythm issues. SUBJECTIVE:  Samuelkyrie Ge denies exertional chest pain, shortness of breath, orthopnea, paroxysmal nocturnal dyspnea, syncope, presyncope, sensed arrhythmia, edema and fatigue. The patient denies numbness or weakness to suggest cerebrovascular accident or transient ischemic attack.       Review of

## 2020-05-26 PROCEDURE — 93298 REM INTERROG DEV EVAL SCRMS: CPT | Performed by: CLINICAL NURSE SPECIALIST

## 2020-05-26 PROCEDURE — G2066 INTER DEVC REMOTE 30D: HCPCS | Performed by: CLINICAL NURSE SPECIALIST

## 2020-06-26 PROCEDURE — G2066 INTER DEVC REMOTE 30D: HCPCS | Performed by: CLINICAL NURSE SPECIALIST

## 2020-06-26 PROCEDURE — 93298 REM INTERROG DEV EVAL SCRMS: CPT | Performed by: CLINICAL NURSE SPECIALIST

## 2020-07-27 PROCEDURE — 93298 REM INTERROG DEV EVAL SCRMS: CPT | Performed by: CLINICAL NURSE SPECIALIST

## 2020-07-27 PROCEDURE — G2066 INTER DEVC REMOTE 30D: HCPCS | Performed by: CLINICAL NURSE SPECIALIST

## 2020-08-14 ENCOUNTER — TELEPHONE (OUTPATIENT)
Dept: CARDIOLOGY | Age: 80
End: 2020-08-14

## 2020-08-14 NOTE — TELEPHONE ENCOUNTER
8/14 called needing to r/s this apt with Dr. Mariusz Rodriges, Can be with Dr. Chemo Jaimes or Dr. Ed Cohen our new doctors or the pt can see one of the NP if they so chose. if they want another DR in our office, they can be offered the next avalibale apt with them, they are booked out. attempt-1    provider not here, spoke to the pt, she is going to speak to family, and call us back.

## 2020-08-27 PROCEDURE — 93298 REM INTERROG DEV EVAL SCRMS: CPT | Performed by: CLINICAL NURSE SPECIALIST

## 2020-08-27 PROCEDURE — G2066 INTER DEVC REMOTE 30D: HCPCS | Performed by: CLINICAL NURSE SPECIALIST

## 2020-09-10 PROCEDURE — G2066 INTER DEVC REMOTE 30D: HCPCS | Performed by: CLINICAL NURSE SPECIALIST

## 2020-09-10 PROCEDURE — 93298 REM INTERROG DEV EVAL SCRMS: CPT | Performed by: CLINICAL NURSE SPECIALIST

## 2020-09-10 NOTE — PROGRESS NOTES
This is first episode of PAF detected lasting 8 min. Continue to observe.   If recurrence, consider anticoaguation

## 2020-09-30 PROCEDURE — G2066 INTER DEVC REMOTE 30D: HCPCS | Performed by: CLINICAL NURSE SPECIALIST

## 2020-09-30 PROCEDURE — 93298 REM INTERROG DEV EVAL SCRMS: CPT | Performed by: CLINICAL NURSE SPECIALIST

## 2020-10-05 ENCOUNTER — TELEPHONE (OUTPATIENT)
Dept: CARDIOLOGY | Age: 80
End: 2020-10-05

## 2020-10-05 ENCOUNTER — OFFICE VISIT (OUTPATIENT)
Dept: CARDIOLOGY | Facility: CLINIC | Age: 80
End: 2020-10-05

## 2020-10-05 VITALS
HEIGHT: 65 IN | OXYGEN SATURATION: 98 % | BODY MASS INDEX: 27.49 KG/M2 | WEIGHT: 165 LBS | SYSTOLIC BLOOD PRESSURE: 136 MMHG | HEART RATE: 100 BPM | DIASTOLIC BLOOD PRESSURE: 58 MMHG

## 2020-10-05 DIAGNOSIS — Z86.79 HISTORY OF MITRAL VALVE PROLAPSE: ICD-10-CM

## 2020-10-05 DIAGNOSIS — R06.02 SHORTNESS OF BREATH: Primary | ICD-10-CM

## 2020-10-05 DIAGNOSIS — Z95.818 STATUS POST PLACEMENT OF IMPLANTABLE LOOP RECORDER: ICD-10-CM

## 2020-10-05 DIAGNOSIS — E66.3 OVERWEIGHT: ICD-10-CM

## 2020-10-05 DIAGNOSIS — E78.00 HYPERCHOLESTEROLEMIA: ICD-10-CM

## 2020-10-05 PROCEDURE — 99204 OFFICE O/P NEW MOD 45 MIN: CPT | Performed by: INTERNAL MEDICINE

## 2020-10-05 PROCEDURE — 93000 ELECTROCARDIOGRAM COMPLETE: CPT | Performed by: INTERNAL MEDICINE

## 2020-10-05 NOTE — PROGRESS NOTES
Reason for Visit: Shortness of breath.    HPI:  Yeni Pastor is a 80 y.o. female is being seen for consultation today at the request of Mj Anderson MD for shortness of breath.  She is a former patient of Dr. Mehta and is transitioning care.  She has chronic shortness breath related to her asthma.    Reports history of mitral valve prolapse but there is no mention of this on either echo report from 2017 and 2019 with normal mitral valve leaflet mobility.  She denies any significant palpitations, chest pain, dizziness, syncope, PND, or orthopnea.  She does report that sometimes she can feel the blood flowing within her chest and sometimes it feels like a rapid flow blood.  Patient reports that there was skipped beats noted on previous interrogation done at [facility] and that anticoagulation may be needed in the future.    Previous Cardiac Testing and Procedures:  -Dobutamine stress echo (3/7/2014) negative for myocardial ischemia  -Echo (3/17/2014) EF 50%  -Lipid panel (10/25/2017) total cholesterol 210, HDL 61, , triglycerides 82  -Echo (12/18/2017) EF 55-60%  -Lexiscan nuclear stress (12/18/2017) negative for ischemia, EF 64%  -[employer] reveal Linq recorder implantation (1/4/2018)  -Echo (9/17/2019) EF 55-60%, diastolic dysfunction, mild TR    Patient Active Problem List   Diagnosis   • Pseudoptosis   • Neoplasm of uncertain behavior   • Status post placement of implantable loop recorder   • Hypercholesterolemia       Social History     Tobacco Use   • Smoking status: Never Smoker   • Smokeless tobacco: Never Used   Substance Use Topics   • Alcohol use: No   • Drug use: No       Family History   Problem Relation Age of Onset   • Diabetes Mother    • Heart disease Mother    • Hypertension Mother    • Heart disease Father    • Hypertension Father    • Cancer Sister         BREAST   • Heart disease Brother    • Heart disease Brother        The following portions of the patient's history were reviewed and  updated as appropriate: allergies, current medications, past family history, past medical history, past social history, past surgical history and problem list.      Current Outpatient Medications:   •  albuterol (PROVENTIL HFA;VENTOLIN HFA) 108 (90 BASE) MCG/ACT inhaler, Inhale 2 puffs Every 4 (Four) Hours As Needed., Disp: , Rfl:   •  budesonide-formoterol (SYMBICORT) 160-4.5 MCG/ACT inhaler, Inhale 2 puffs 2 (Two) Times a Day., Disp: , Rfl:   •  ergocalciferol (ERGOCALCIFEROL) 80130 UNITS capsule, Take 50,000 Units by mouth 1 (One) Time Per Week., Disp: , Rfl:   •  esomeprazole (nexIUM) 40 MG capsule, Take 40 mg by mouth Daily., Disp: , Rfl:   •  levothyroxine (SYNTHROID, LEVOTHROID) 50 MCG tablet, Take 50 mcg by mouth Daily., Disp: , Rfl:   •  montelukast (SINGULAIR) 10 MG tablet, Take 10 mg by mouth daily., Disp: , Rfl:   •  Probiotic Product (PROBIOTIC DAILY PO), Take 1 tablet/day by mouth Daily., Disp: , Rfl:   •  tiotropium (Spiriva HandiHaler) 18 MCG per inhalation capsule, INHALE THE ENTIRE CONTENTS OF 1 CAPSULE ONCE A DAY USING HANDIHALER DEVICE, Disp: , Rfl:     Review of Systems   Constitution: Positive for malaise/fatigue. Negative for chills, fever and weight loss.   HENT: Negative for sore throat.    Eyes: Negative for blurred vision and visual disturbance.   Cardiovascular: Positive for dyspnea on exertion. Negative for chest pain, leg swelling, palpitations, paroxysmal nocturnal dyspnea and syncope.   Respiratory: Positive for shortness of breath. Negative for cough.    Endocrine: Negative for cold intolerance and polyuria.   Skin: Negative for itching and rash.   Musculoskeletal: Negative for joint swelling and myalgias.   Gastrointestinal: Negative for abdominal pain, diarrhea and vomiting.   Genitourinary: Negative for dysuria and hematuria.   Neurological: Negative for dizziness, headaches and numbness.   Psychiatric/Behavioral: Negative for depression. The patient is not nervous/anxious.   "  Allergic/Immunologic: Negative for hives.       Objective   /58 (BP Location: Left arm, Patient Position: Sitting, Cuff Size: Adult)   Pulse 100   Ht 165.1 cm (65\")   Wt 74.8 kg (165 lb)   SpO2 98%   BMI 27.46 kg/m²   Constitutional:       Appearance: Well-developed.   Eyes:      Conjunctiva/sclera: Conjunctivae normal.      Pupils: Pupils are equal, round, and reactive to light.   HENT:      Head: Normocephalic and atraumatic.   Neck:      Musculoskeletal: Normal range of motion and neck supple.      Thyroid: No thyromegaly.      Vascular: No JVD.   Pulmonary:      Effort: Pulmonary effort is normal.      Breath sounds: Normal breath sounds. No wheezing. No rales.   Cardiovascular:      Normal rate. Regular rhythm.   Abdominal:      General: Bowel sounds are normal. There is no distension.      Palpations: Abdomen is soft.      Tenderness: There is no abdominal tenderness.   Musculoskeletal: Normal range of motion.   Skin:     General: Skin is warm and dry.      Findings: No rash.   Neurological:      Mental Status: Alert and oriented to person, place, and time.      Coordination: Coordination normal.   Psychiatric:         Behavior: Behavior normal.         ECG 12 Lead    Date/Time: 10/5/2020 11:15 AM  Performed by: Tyler Fontana MD  Authorized by: Tyler Fontana MD   Previous ECG: no previous ECG available  Rhythm: sinus tachycardia  Other findings: bilateral atrial abnormality              ICD-10-CM ICD-9-CM   1. Shortness of breath  R06.02 786.05   2. Status post placement of implantable loop recorder  Z95.818 V45.09   3. History of mitral valve prolapse  Z86.79 V12.59   4. Overweight  E66.3 278.02   5. Hypercholesterolemia  E78.00 272.0         Assessment/Plan:  1.  Shortness of breath: Appears to be primarily related to her asthma.  Deconditioning could also be playing a role.  Previous cardiac testing has been relatively unremarkable and there is no evidence of significant cardiac " contribution to this.  Encourage regular activity and physical exertion.    2.  Implantable loop recorder in place: Unclear indication for device placement.  Reported possible arrhythmia on previous interrogation at [facility].  Will attempt to obtain a copy of this.    3.  History of mitral valve prolapse: Unclear how this diagnosis was made.  I reviewed the echocardiogram report from 2017 and 2019 at [facility] and leaflet motion was reported as normal.    4.  Overweight: Patient's Body mass index is 27.46 kg/m². BMI is above normal parameters. Recommendations include: exercise counseling and nutrition counseling.     5.  Hypercholesterolemia: Elevated total cholesterol and LDL cholesterol on previous lipid panel from 2017.  Will obtain copy of most recent lipid panel.

## 2020-10-05 NOTE — TELEPHONE ENCOUNTER
The pt decided she does not wish to stay with [facility]., she wants to stay with our office. She does not wish for her loop recorder information to be changed over to [facility]. Per the pt do not send them any information. She is going to be mailed information of the doctors of this office so she can choose what dr she wants to see. She was a Real pt. So if she calls back she will need a new to provider apt with one of the docs.

## 2020-12-07 ENCOUNTER — HOSPITAL ENCOUNTER (EMERGENCY)
Age: 80
Discharge: HOME | End: 2020-12-07
Payer: MEDICARE

## 2020-12-07 ENCOUNTER — APPOINTMENT (OUTPATIENT)
Dept: CT IMAGING | Age: 80
End: 2020-12-07
Payer: MEDICARE

## 2020-12-07 VITALS
OXYGEN SATURATION: 97 % | TEMPERATURE: 97.6 F | SYSTOLIC BLOOD PRESSURE: 135 MMHG | RESPIRATION RATE: 18 BRPM | DIASTOLIC BLOOD PRESSURE: 71 MMHG | HEART RATE: 65 BPM

## 2020-12-07 LAB
ALBUMIN SERPL-MCNC: 4 G/DL (ref 3.5–5.2)
ALP BLD-CCNC: 89 U/L (ref 35–104)
ALT SERPL-CCNC: 22 U/L (ref 5–33)
ANION GAP SERPL CALCULATED.3IONS-SCNC: 12 MMOL/L (ref 7–19)
AST SERPL-CCNC: 27 U/L (ref 5–32)
BACTERIA: NEGATIVE /HPF
BASOPHILS ABSOLUTE: 0 K/UL (ref 0–0.2)
BASOPHILS RELATIVE PERCENT: 0.3 % (ref 0–1)
BILIRUB SERPL-MCNC: <0.2 MG/DL (ref 0.2–1.2)
BILIRUBIN URINE: NEGATIVE
BLOOD, URINE: NEGATIVE
BUN BLDV-MCNC: 17 MG/DL (ref 8–23)
CALCIUM SERPL-MCNC: 9 MG/DL (ref 8.8–10.2)
CHLORIDE BLD-SCNC: 105 MMOL/L (ref 98–111)
CLARITY: CLEAR
CO2: 20 MMOL/L (ref 22–29)
COLOR: YELLOW
CREAT SERPL-MCNC: 0.8 MG/DL (ref 0.5–0.9)
CRYSTALS, UA: ABNORMAL /HPF
EOSINOPHILS ABSOLUTE: 0 K/UL (ref 0–0.6)
EOSINOPHILS RELATIVE PERCENT: 1 % (ref 0–5)
EPITHELIAL CELLS, UA: 1 /HPF (ref 0–5)
GFR AFRICAN AMERICAN: >59
GFR NON-AFRICAN AMERICAN: >60
GLUCOSE BLD-MCNC: 98 MG/DL (ref 74–109)
GLUCOSE URINE: NEGATIVE MG/DL
HCT VFR BLD CALC: 35.2 % (ref 37–47)
HEMOGLOBIN: 11 G/DL (ref 12–16)
HYALINE CASTS: 1 /HPF (ref 0–8)
IMMATURE GRANULOCYTES #: 0 K/UL
KETONES, URINE: NEGATIVE MG/DL
LEUKOCYTE ESTERASE, URINE: ABNORMAL
LIPASE: 29 U/L (ref 13–60)
LYMPHOCYTES ABSOLUTE: 1 K/UL (ref 1.1–4.5)
LYMPHOCYTES RELATIVE PERCENT: 32.6 % (ref 20–40)
MCH RBC QN AUTO: 27.6 PG (ref 27–31)
MCHC RBC AUTO-ENTMCNC: 31.3 G/DL (ref 33–37)
MCV RBC AUTO: 88.4 FL (ref 81–99)
MONOCYTES ABSOLUTE: 0.3 K/UL (ref 0–0.9)
MONOCYTES RELATIVE PERCENT: 10.6 % (ref 0–10)
NEUTROPHILS ABSOLUTE: 1.7 K/UL (ref 1.5–7.5)
NEUTROPHILS RELATIVE PERCENT: 55.2 % (ref 50–65)
NITRITE, URINE: NEGATIVE
PDW BLD-RTO: 12.8 % (ref 11.5–14.5)
PH UA: 5 (ref 5–8)
PLATELET # BLD: 203 K/UL (ref 130–400)
PMV BLD AUTO: 10.7 FL (ref 9.4–12.3)
POTASSIUM REFLEX MAGNESIUM: 4.6 MMOL/L (ref 3.5–5)
PROTEIN UA: NEGATIVE MG/DL
RBC # BLD: 3.98 M/UL (ref 4.2–5.4)
RBC UA: 2 /HPF (ref 0–4)
SODIUM BLD-SCNC: 137 MMOL/L (ref 136–145)
SPECIFIC GRAVITY UA: 1.01 (ref 1–1.03)
TOTAL PROTEIN: 6.6 G/DL (ref 6.6–8.7)
TROPONIN: <0.01 NG/ML (ref 0–0.03)
UROBILINOGEN, URINE: 0.2 E.U./DL
WBC # BLD: 3.1 K/UL (ref 4.8–10.8)
WBC UA: 47 /HPF (ref 0–5)

## 2020-12-07 PROCEDURE — 2580000003 HC RX 258: Performed by: NURSE PRACTITIONER

## 2020-12-07 PROCEDURE — 83690 ASSAY OF LIPASE: CPT

## 2020-12-07 PROCEDURE — 87086 URINE CULTURE/COLONY COUNT: CPT

## 2020-12-07 PROCEDURE — 36415 COLL VENOUS BLD VENIPUNCTURE: CPT

## 2020-12-07 PROCEDURE — 81001 URINALYSIS AUTO W/SCOPE: CPT

## 2020-12-07 PROCEDURE — 99283 EMERGENCY DEPT VISIT LOW MDM: CPT

## 2020-12-07 PROCEDURE — 85025 COMPLETE CBC W/AUTO DIFF WBC: CPT

## 2020-12-07 PROCEDURE — 84484 ASSAY OF TROPONIN QUANT: CPT

## 2020-12-07 PROCEDURE — 93005 ELECTROCARDIOGRAM TRACING: CPT | Performed by: NURSE PRACTITIONER

## 2020-12-07 PROCEDURE — 80053 COMPREHEN METABOLIC PANEL: CPT

## 2020-12-07 PROCEDURE — 74177 CT ABD & PELVIS W/CONTRAST: CPT

## 2020-12-07 PROCEDURE — 6360000004 HC RX CONTRAST MEDICATION: Performed by: NURSE PRACTITIONER

## 2020-12-07 RX ORDER — 0.9 % SODIUM CHLORIDE 0.9 %
1000 INTRAVENOUS SOLUTION INTRAVENOUS ONCE
Status: COMPLETED | OUTPATIENT
Start: 2020-12-07 | End: 2020-12-07

## 2020-12-07 RX ORDER — ONDANSETRON 2 MG/ML
4 INJECTION INTRAMUSCULAR; INTRAVENOUS ONCE
Status: DISCONTINUED | OUTPATIENT
Start: 2020-12-07 | End: 2020-12-07 | Stop reason: HOSPADM

## 2020-12-07 RX ORDER — MORPHINE SULFATE 4 MG/ML
4 INJECTION, SOLUTION INTRAMUSCULAR; INTRAVENOUS ONCE
Status: DISCONTINUED | OUTPATIENT
Start: 2020-12-07 | End: 2020-12-07 | Stop reason: HOSPADM

## 2020-12-07 RX ORDER — CEPHALEXIN 500 MG/1
500 CAPSULE ORAL 2 TIMES DAILY
Qty: 10 CAPSULE | Refills: 0 | Status: SHIPPED | OUTPATIENT
Start: 2020-12-07 | End: 2020-12-12

## 2020-12-07 RX ADMIN — IOPAMIDOL 90 ML: 755 INJECTION, SOLUTION INTRAVENOUS at 16:05

## 2020-12-07 RX ADMIN — SODIUM CHLORIDE 1000 ML: 9 INJECTION, SOLUTION INTRAVENOUS at 15:16

## 2020-12-07 ASSESSMENT — PAIN DESCRIPTION - ORIENTATION: ORIENTATION: LEFT;UPPER

## 2020-12-07 ASSESSMENT — PAIN DESCRIPTION - LOCATION: LOCATION: ABDOMEN

## 2020-12-07 ASSESSMENT — ENCOUNTER SYMPTOMS: ABDOMINAL PAIN: 1

## 2020-12-07 ASSESSMENT — PAIN SCALES - GENERAL: PAINLEVEL_OUTOF10: 4

## 2020-12-07 NOTE — ED PROVIDER NOTES
[facility] EM[facility] DE[facility]  eMERGENCY dEPARTMENT eNCOUnter      Pt Name: Ayse Enriquez  MRN: [id number]  [place] 1940  Date of evaluation: 2020  Provider: Sherrill Gomez, [address]       Chief Complaint   Patient presents with    Abdominal Pain     LUQ x1 week progressively worsening, denies N/V/D         HISTORY OF PRESENT ILLNESS   (Location/Symptom, Timing/Onset,Context/Setting, Quality, Duration, Modifying Factors, Severity)  Note limiting factors. Cyrus Sanders a [de-identified] y.o. female who presents to the emergency department for evaluation of abdominal pain. Pt tells me that she has had left upper quadrant abdominal pain over past week with worsening over past 3 days. She describes abdominal pain as constant, sharp and of a moderately severe intensity. She denies fever, vomiting as well as any problems with diarrhea. She has had no cough or chest pain. She has history of cholecystectomy, appendectomy and hysterectomy. She has history of GERD tells me that she continues with prilosec. HPI    Nursing Notes were reviewed. REVIEW OF SYSTEMS    (2-9 systems for level 4, 10 or more for level 5)     Review of Systems   Constitutional: Negative. Gastrointestinal: Positive for abdominal pain. All other systems reviewed and are negative. A complete review of systems was performed and is negative except as noted above in the HPI.        PAST MEDICAL HISTORY     Past Medical History:   Diagnosis Date    Abdominal pain, other specified site     Asthma     Cataract     GERD (gastroesophageal reflux disease)     Glaucoma     Headache     Status post placement of implantable loop recorder 2018    Vitamin D deficiency          SURGICAL HISTORY       Past Surgical History:   Procedure Laterality Date    APPENDECTOMY      CATARACT REMOVAL      CERVICAL DISC SURGERY       SECTION      CHOLECYSTECTOMY      COLONOSCOPY  2009    Dr Suarez Officer COLONOSCOPY  2012     [facility]    FOOT SURGERY      HYSTERECTOMY      TONSILLECTOMY      UPPER GASTROINTESTINAL ENDOSCOPY  5-4-2009    Dr Nichols [place] ENDOSCOPY  1-    Dr Danyell Hernandes       Previous Medications    ALBUTEROL SULFATE (PROAIR HFA IN)    Inhale  into the lungs as needed. BUDESONIDE-FORMOTEROL (SYMBICORT) 160-4.5 MCG/ACT AERO        DICLOFENAC SODIUM 1 % GEL    2 g as needed     ERGOCALCIFEROL (ERGOCALCIFEROL) 56289 UNITS CAPSULE    Take 50,000 Units by mouth once a week    ESCITALOPRAM (LEXAPRO) 10 MG TABLET    Take 10 mg by mouth daily     ESOMEPRAZOLE MAGNESIUM (NEXIUM) 40 MG PACK    Take 40 mg by mouth 2 times daily     FLUTICASONE FUROATE-VILANTEROL 200-25 MCG/INH AEPB    Inhale 1 puff into the lungs daily     FUROSEMIDE (LASIX) 40 MG TABLET    Take 40 mg by mouth as needed     LEVOTHYROXINE (SYNTHROID) 50 MCG TABLET    Take 50 mcg by mouth Daily    MONTELUKAST SODIUM (SINGULAIR PO)    Take 10 mg by mouth daily.     OXYGEN    Inhale into the lungs every evening    PANTOPRAZOLE (PROTONIX) 40 MG TABLET    Take 40 mg by mouth daily     PROBIOTIC PRODUCT (PROBIOTIC ADVANCED PO)    Take by mouth daily     TIOTROPIUM (SPIRIVA) 18 MCG INHALATION CAPSULE    Inhale 18 mcg into the lungs daily       ALLERGIES     Latex; Tramadol; Codeine; and Marcaine [bupivacaine hcl]    FAMILY HISTORY       Family History   Problem Relation Age of Onset    Cancer Sister         breast cancer    Heart Disease Brother     Heart Disease Brother     Colon Polyps Neg Hx     COPD Neg Hx     Esophageal Cancer Neg Hx     Liver Cancer Neg Hx     Liver Disease Neg Hx     Rectal Cancer Neg Hx     Stomach Cancer Neg Hx           SOCIAL HISTORY       Social History     Socioeconomic History    Marital status:      Spouse name: None    Number of children: None    Years of education: None    Highest education level: None   Occupational History    None   Social Needs    Financial resource strain: None    Food insecurity     Worry: None     Inability: None    Transportation needs     Medical: None     Non-medical: None   Tobacco Use    Smoking status: Never Smoker    Smokeless tobacco: Never Used   Substance and Sexual Activity    Alcohol use: No    Drug use: No    Sexual activity: None   Lifestyle    Physical activity     Days per week: None     Minutes per session: None    Stress: None   Relationships    Social connections     Talks on phone: None     Gets together: None     Attends [employer] service: None     Active member of club or organization: None     Attends meetings of clubs or organizations: None     Relationship status: None    Intimate partner violence     Fear of current or ex partner: None     Emotionally abused: None     Physically abused: None     Forced sexual activity: None   Other Topics Concern    None   Social History Narrative    None       SCREENINGS             PHYSICAL EXAM    (up to 7 for level 4, 8 or more for level 5)     ED Triage Vitals   BP Temp Temp Source Pulse Resp SpO2 Height Weight   12/07/20 1410 12/07/20 1408 12/07/20 1408 12/07/20 1408 12/07/20 1408 12/07/20 1408 -- --   (!) 140/59 98 °F (36.7 °C) Tympanic 69 16 96 %         Physical Exam  Vitals signs reviewed. HENT:      Head: Normocephalic. Right Ear: External ear normal.      Left Ear: External ear normal.   Eyes:      Conjunctiva/sclera: Conjunctivae normal.      Pupils: Pupils are equal, round, and reactive to light. Neck:      Musculoskeletal: Normal range of motion. Cardiovascular:      Rate and Rhythm: Normal rate and regular rhythm. Heart sounds: Normal heart sounds. Pulmonary:      Effort: Pulmonary effort is normal.      Breath sounds: Normal breath sounds. Abdominal:      General: Bowel sounds are normal.      Palpations: Abdomen is soft. Tenderness: There is abdominal tenderness in the left upper quadrant. There is no guarding. Musculoskeletal: Normal range of motion. Skin:     General: Skin is warm and dry. Neurological:      Mental Status: She is alert and oriented to person, place, and time. DIAGNOSTIC RESULTS     EKG: All EKG's are interpreted by the Emergency Department Physician who either signs or Co-signs this chart in the absence of acardiologist.    There is a regular rate and rhythm. SR Normal CA interval and normal P waves. Normal QRS interval. Normal QT interval. No obvious ST elevation or ST depression. RADIOLOGY:   Non-plain film images such as CT, Ultrasound andMRI are read by the radiologist. Plain radiographic images are visualized and preliminarily interpreted by the emergency physician with the below findings:        Interpretation per the Radiologist below, if available at the time of this note:    CT ABDOMEN PELVIS W IV CONTRAST Additional Contrast? None   Final Result   1. No acute abnormality is seen.    Signed by Dr Sriram Singh on 12/7/2020 4:27 PM            ED BEDSIDE ULTRASOUND:   Performed by ED Physician - none    LABS:  Labs Reviewed   CBC WITH AUTO DIFFERENTIAL - Abnormal; Notable for the following components:       Result Value    WBC 3.1 (*)     RBC 3.98 (*)     Hemoglobin 11.0 (*)     Hematocrit 35.2 (*)     MCHC 31.3 (*)     Monocytes % 10.6 (*)     Lymphocytes Absolute 1.0 (*)     All other components within normal limits   COMPREHENSIVE METABOLIC PANEL W/ REFLEX TO MG FOR LOW K - Abnormal; Notable for the following components:    CO2 20 (*)     All other components within normal limits   URINE RT REFLEX TO CULTURE - Abnormal; Notable for the following components:    Leukocyte Esterase, Urine LARGE (*)     All other components within normal limits   MICROSCOPIC URINALYSIS - Abnormal; Notable for the following components:    Bacteria, UA NEGATIVE (*)     Crystals, UA NEG (*)     WBC, UA 47 (*)     All other components within normal limits   CULTURE, URINE   TROPONIN   LIPASE All other labs were within normal range or not returned as of this dictation. RE-ASSESSMENT           EMERGENCY DEPARTMENT COURSE and DIFFERENTIALDIAGNOSIS/MDM:   Vitals:    Vitals:    12/07/20 1408 12/07/20 1410   BP:  (!) 140/59   Pulse: 69    Resp: 16    Temp: 98 °F (36.7 °C)    TempSrc: Tympanic    SpO2: 96%        MDM      CONSULTS:  None    PROCEDURES:  Unless otherwise notedbelow, none     Procedures    FINAL IMPRESSION     1. Left upper quadrant abdominal pain    2.  Pyuria          DISPOSITION/PLAN   DISPOSITION        PATIENT REFERRED TO:  Nelson Melo  [facility] [address]  [phone]    Schedule an appointment as soon as possible for a visit in 3 days  fail to improve      DISCHARGE MEDICATIONS:       Current Discharge Medication List           Medication List      START taking these medications    cephALEXin 500 MG capsule  Commonly known as:  Keflex  Take 1 capsule by mouth 2 times daily for 5 days        ASK your doctor about these medications    budesonide-formoterol 160-4.5 MCG/ACT Aero  Commonly known as:  SYMBICORT     diclofenac sodium 1 % Gel  Commonly known as:  VOLTAREN     ergocalciferol 1.25 MG (82515 UT) capsule  Commonly known as:  ERGOCALCIFEROL     escitalopram 10 MG tablet  Commonly known as:  LEXAPRO     esomeprazole Magnesium 40 MG Pack  Commonly known as:  NEXIUM     Fluticasone furoate-vilanterol 200-25 MCG/INH Aepb inhaler  Commonly known as:  BREO ELLIPTA     furosemide 40 MG tablet  Commonly known as:  LASIX     levothyroxine 50 MCG tablet  Commonly known as:  SYNTHROID     OXYGEN     pantoprazole 40 MG tablet  Commonly known as:  PROTONIX     PROAIR HFA IN     PROBIOTIC ADVANCED PO     SINGULAIR PO     tiotropium 18 MCG inhalation capsule  Commonly known as:  [facility]           Where to Get Your Medications      You can get these medications from any pharmacy    Bring a paper prescription for each of these medications  · cephALEXin 500 MG capsule (Pleasenote that portions of this note were completed with a voice recognition program.  Efforts were made to edit the dictations but occasionally words are mis-transcribed.)              Say Rodriguez, KRISTIAN  12/07/20 Katt Adrian, APRN  12/07/20 1001

## 2020-12-08 LAB
EKG P AXIS: -3 DEGREES
EKG P-R INTERVAL: 126 MS
EKG Q-T INTERVAL: 400 MS
EKG QRS DURATION: 90 MS
EKG QTC CALCULATION (BAZETT): 417 MS
EKG T AXIS: 13 DEGREES

## 2020-12-08 PROCEDURE — 93010 ELECTROCARDIOGRAM REPORT: CPT | Performed by: INTERNAL MEDICINE

## 2020-12-09 LAB — URINE CULTURE, ROUTINE: NORMAL

## 2021-02-08 ENCOUNTER — OFFICE VISIT (OUTPATIENT)
Dept: CARDIOLOGY CLINIC | Age: 81
End: 2021-02-08
Payer: MEDICARE

## 2021-02-08 VITALS
BODY MASS INDEX: 27.16 KG/M2 | HEART RATE: 90 BPM | SYSTOLIC BLOOD PRESSURE: 136 MMHG | DIASTOLIC BLOOD PRESSURE: 70 MMHG | WEIGHT: 163 LBS | HEIGHT: 65 IN

## 2021-02-08 DIAGNOSIS — I34.0 MILD MITRAL REGURGITATION: ICD-10-CM

## 2021-02-08 DIAGNOSIS — R06.09 DOE (DYSPNEA ON EXERTION): Primary | ICD-10-CM

## 2021-02-08 PROCEDURE — G8484 FLU IMMUNIZE NO ADMIN: HCPCS | Performed by: INTERNAL MEDICINE

## 2021-02-08 PROCEDURE — 1090F PRES/ABSN URINE INCON ASSESS: CPT | Performed by: INTERNAL MEDICINE

## 2021-02-08 PROCEDURE — 99213 OFFICE O/P EST LOW 20 MIN: CPT | Performed by: INTERNAL MEDICINE

## 2021-02-08 PROCEDURE — G8427 DOCREV CUR MEDS BY ELIG CLIN: HCPCS | Performed by: INTERNAL MEDICINE

## 2021-02-08 PROCEDURE — 1123F ACP DISCUSS/DSCN MKR DOCD: CPT | Performed by: INTERNAL MEDICINE

## 2021-02-08 PROCEDURE — G8400 PT W/DXA NO RESULTS DOC: HCPCS | Performed by: INTERNAL MEDICINE

## 2021-02-08 PROCEDURE — 1036F TOBACCO NON-USER: CPT | Performed by: INTERNAL MEDICINE

## 2021-02-08 PROCEDURE — G8417 CALC BMI ABV UP PARAM F/U: HCPCS | Performed by: INTERNAL MEDICINE

## 2021-02-08 PROCEDURE — 4040F PNEUMOC VAC/ADMIN/RCVD: CPT | Performed by: INTERNAL MEDICINE

## 2021-02-08 RX ORDER — FLUTICASONE FUROATE, UMECLIDINIUM BROMIDE AND VILANTEROL TRIFENATATE 100; 62.5; 25 UG/1; UG/1; UG/1
1 POWDER RESPIRATORY (INHALATION) DAILY
COMMUNITY

## 2021-02-08 NOTE — PATIENT INSTRUCTIONS
? Nothing to eat or drink 6-8 hours prior to appointment time. It is okay to drink small amounts of water during the four hours prior to the test.  ? Nitroglycerin patches must be taken off 1 hour before testing. ? Wear comfortable clothing. ? Please refrain from any strenuous exercise or activities the day before your test, or the day of your test.  ? The Nuclear Lexiscan Stress test takes about 2 ½ to 3 hours to complete. If for any reason you are unable to keep this appointment, please contact Outpatient Scheduling, [phone], as soon as possible to reschedule.

## 2021-02-08 NOTE — PROGRESS NOTES
HISTORY  20-year-old lady with a history of hypertension, paroxysmal atrial fibrillation, lower extremity edema, and dyspnea on exertion returns for follow-up visit. Prompted by complaints of palpitation she had a loop recorder implanted. To this point she had but a single approximate 15 minute episode of atrial fibrillation which was noted in September 2020. Because of slowly progressive dyspnea on exertion she has previously been maintained on theophylline which caused chest discomfort and more recently has been seeing a pulmonologist in [facility] in further evaluation. She last had an echocardiogram in September 2019 which revealed normal left ventricular systolic function, mild diastolic dysfunction, and no evidence of valvular heart disease. Another recent complaint has been that of some progressive decline in her memory. PHYSICAL EXAM   On exam she carries 163 pounds in a 5 foot 5 inch frame. Pressure is 136/70 pulse of 90. EOMs full, sclerae and conjunctiva normal. PERRLA. Mask in place. Trachea midline with no neck masses. Assessment of internal jugular veins reveals no elevation of central venous pressure at 45 degrees. Carotid pulses normal without delay or bruit. Thyroid normal to palpation. Chest exam reveals normal respiratory effort, no abnormal breath sounds and normal expiratory phase. No skin lesions seen. PMI normal. S1, S2 normal without murmur or humera or click. Normal bowel sounds without palpable mass or bruit. No clubbing or acrocyanosis. No significant lower extremity edema or signs of venous insufficiency. General motor strength appears to be within normal limits. Normal range of motion with normal gait. Alert, oriented x 3, memory and cognition normal as reflected by history and conversation. Interrogation of her removed recorder demonstrates no significant dysrhythmia since last evaluated.     ASSESSMENT/PLAN: 1.  PAF - single episode last September. No action taken  2. Dyspnea on exertion - no evidence of cardiac basis with only very mild diastolic dysfunction. Continue pulmonary evaluation  3. Lower extremity edema - again no suspicion this is cardiac basis. Probably venous insufficiency from standing on her feet as a [place] records reviewed prior to today's clinic visit including visually reviewing recent diagnostic studies such as ECHOs, labs, and angiograms as well as reading previous encounter notes. More than 20 minutes spent face-to-face with patient in evaluating, and carefully explaining problems and the planned approach and the reasons behind the decisions.

## 2021-02-08 NOTE — PROGRESS NOTES
Call [facility] to request release of care from [address]. Send a manual reading tomorrow from home monitor to establish connection with our office.

## 2021-02-23 ENCOUNTER — HOSPITAL ENCOUNTER (OUTPATIENT)
Dept: NUCLEAR MEDICINE | Age: 81
Discharge: HOME | End: 2021-02-25
Payer: MEDICARE

## 2021-02-23 ENCOUNTER — OFFICE VISIT (OUTPATIENT)
Dept: CARDIOLOGY CLINIC | Age: 81
End: 2021-02-23
Payer: MEDICARE

## 2021-02-23 VITALS
SYSTOLIC BLOOD PRESSURE: 134 MMHG | DIASTOLIC BLOOD PRESSURE: 62 MMHG | BODY MASS INDEX: 26.82 KG/M2 | HEIGHT: 65 IN | HEART RATE: 88 BPM | WEIGHT: 161 LBS

## 2021-02-23 DIAGNOSIS — R06.09 DOE (DYSPNEA ON EXERTION): ICD-10-CM

## 2021-02-23 DIAGNOSIS — R55 SYNCOPE, UNSPECIFIED SYNCOPE TYPE: ICD-10-CM

## 2021-02-23 DIAGNOSIS — I10 ESSENTIAL HYPERTENSION: ICD-10-CM

## 2021-02-23 DIAGNOSIS — R06.02 SOB (SHORTNESS OF BREATH): Primary | ICD-10-CM

## 2021-02-23 DIAGNOSIS — I34.0 MILD MITRAL REGURGITATION: ICD-10-CM

## 2021-02-23 DIAGNOSIS — I48.0 PAROXYSMAL ATRIAL FIBRILLATION (HCC): ICD-10-CM

## 2021-02-23 DIAGNOSIS — Z95.818 STATUS POST PLACEMENT OF IMPLANTABLE LOOP RECORDER: ICD-10-CM

## 2021-02-23 PROCEDURE — 99213 OFFICE O/P EST LOW 20 MIN: CPT | Performed by: CLINICAL NURSE SPECIALIST

## 2021-02-23 PROCEDURE — A9500 TC99M SESTAMIBI: HCPCS | Performed by: INTERNAL MEDICINE

## 2021-02-23 PROCEDURE — 1036F TOBACCO NON-USER: CPT | Performed by: CLINICAL NURSE SPECIALIST

## 2021-02-23 PROCEDURE — 1123F ACP DISCUSS/DSCN MKR DOCD: CPT | Performed by: CLINICAL NURSE SPECIALIST

## 2021-02-23 PROCEDURE — 6360000002 HC RX W HCPCS: Performed by: INTERNAL MEDICINE

## 2021-02-23 PROCEDURE — G8417 CALC BMI ABV UP PARAM F/U: HCPCS | Performed by: CLINICAL NURSE SPECIALIST

## 2021-02-23 PROCEDURE — 3430000000 HC RX DIAGNOSTIC RADIOPHARMACEUTICAL: Performed by: INTERNAL MEDICINE

## 2021-02-23 PROCEDURE — 78452 HT MUSCLE IMAGE SPECT MULT: CPT

## 2021-02-23 PROCEDURE — G8427 DOCREV CUR MEDS BY ELIG CLIN: HCPCS | Performed by: CLINICAL NURSE SPECIALIST

## 2021-02-23 PROCEDURE — 1090F PRES/ABSN URINE INCON ASSESS: CPT | Performed by: CLINICAL NURSE SPECIALIST

## 2021-02-23 PROCEDURE — 4040F PNEUMOC VAC/ADMIN/RCVD: CPT | Performed by: CLINICAL NURSE SPECIALIST

## 2021-02-23 PROCEDURE — G8484 FLU IMMUNIZE NO ADMIN: HCPCS | Performed by: CLINICAL NURSE SPECIALIST

## 2021-02-23 PROCEDURE — G8400 PT W/DXA NO RESULTS DOC: HCPCS | Performed by: CLINICAL NURSE SPECIALIST

## 2021-02-23 RX ADMIN — TETRAKIS(2-METHOXYISOBUTYLISOCYANIDE)COPPER(I) TETRAFLUOROBORATE 10 MILLICURIE: 1 INJECTION, POWDER, LYOPHILIZED, FOR SOLUTION INTRAVENOUS at 13:46

## 2021-02-23 RX ADMIN — TETRAKIS(2-METHOXYISOBUTYLISOCYANIDE)COPPER(I) TETRAFLUOROBORATE 30 MILLICURIE: 1 INJECTION, POWDER, LYOPHILIZED, FOR SOLUTION INTRAVENOUS at 13:46

## 2021-02-23 RX ADMIN — REGADENOSON 0.4 MG: 0.08 INJECTION, SOLUTION INTRAVENOUS at 13:05

## 2021-02-23 ASSESSMENT — ENCOUNTER SYMPTOMS
WHEEZING: 0
CHEST TIGHTNESS: 0
COUGH: 0
FACIAL SWELLING: 0
NAUSEA: 0
SHORTNESS OF BREATH: 1
EYE REDNESS: 0
VOMITING: 0
ABDOMINAL PAIN: 0

## 2021-02-23 NOTE — PROGRESS NOTES
[facility], [address] [place] [address], [facility]  [address]  Phone: [phone]  Fax: [phone]    OFFICE VISIT:  2021    Boston Richards - : 1940    Reason For Visit:  Kelli Booker is a [de-identified] y.o. female who is here for dyspnea, test results, loop recorder     Diagnosis Orders   1. SOB (shortness of breath)     2. Paroxysmal atrial fibrillation (HCC)     3. Essential hypertension     4. Status post placement of implantable loop recorder           HPI  59-year-old lady with a history of hypertension, paroxysmal atrial fibrillation, lower extremity edema, and dyspnea on exertion returns for follow-up visit. Prompted by complaints of palpitation/ syncope and she had a loop recorder implanted. To this point she had but a single approximate 15 minute episode of atrial fibrillation which was noted in 2020. Because of slowly progressive dyspnea on exertion she has previously been maintained on theophylline which caused chest discomfort and more recently has been seeing a pulmonologist in [facility] in further evaluation (no longer on theophylline). She last had an echocardiogram in 2019 which revealed normal left ventricular systolic function, mild diastolic dysfunction, and no evidence of valvular heart disease. Patient has ongoing asthma and continued progressive dyspnea. She had a Lexiscan nuclear stress test today and results are pending. She denies any fluid overload such as sudden weight gain, orthopnea, PND, or edema     Frantz Chanel is PCP.   Boston Richards has the following history as [facility] in [facility]:    Patient Active Problem List    Diagnosis Date Noted    Postural dizziness with presyncope      Priority: High    Status post placement of implantable loop recorder 2018    Syncope 2018    Essential hypertension 2017    Tachycardia 2017    Current use of proton pump inhibitor 2017  LLQ abdominal pain 2016    Tortuous colon 2016    Biatrial enlargement 2014    Mild mitral regurgitation 2014    SOB (shortness of breath) 2014    Migraine 2014    History of traumatic brain injury 2014    GERD (gastroesophageal reflux disease) 2012    Diverticulosis 2012    History of adenomatous polyp of colon 2011    Epigastric pain 2011     Past Medical History:   Diagnosis Date    Abdominal pain, other specified site     Asthma     Cataract     GERD (gastroesophageal reflux disease)     Glaucoma     Headache     Status post placement of implantable loop recorder 2018    Vitamin D deficiency      Past Surgical History:   Procedure Laterality Date    APPENDECTOMY      CATARACT REMOVAL      CERVICAL DISC SURGERY       SECTION      CHOLECYSTECTOMY      COLONOSCOPY  2009    Dr Eduardo Finley    COLONOSCOPY  2012    Dr Jasper Mahmood ENDOSCOPY  2009    Dr Tito Ramirez ENDOSCOPY  2012    Dr Eduardo Finley     Family History   Problem Relation Age of Onset    Cancer Sister         breast cancer    Heart Disease Brother     Heart Disease Brother     Colon Polyps Neg Hx     COPD Neg Hx     Esophageal Cancer Neg Hx     Liver Cancer Neg Hx     Liver Disease Neg Hx     Rectal Cancer Neg Hx     Stomach Cancer Neg Hx      Social History     Tobacco Use    Smoking status: Never Smoker    Smokeless tobacco: Never Used   Substance Use Topics    Alcohol use: No      Current Outpatient Medications   Medication Sig Dispense Refill    fluticasone-umeclidin-vilant (TRELEGY ELLIPTA) 100-62.5-25 MCG/INH AEPB Inhale 1 puff into the lungs daily      OXYGEN Inhale into the lungs nightly       diclofenac sodium 1 % GEL 2 g as needed  furosemide (LASIX) 40 MG tablet Take 40 mg by mouth as needed       esomeprazole Magnesium (NEXIUM) 40 MG PACK Take 40 mg by mouth 2 times daily       escitalopram (LEXAPRO) 10 MG tablet Take 10 mg by mouth daily       levothyroxine (SYNTHROID) 50 MCG tablet Take 50 mcg by mouth Daily      ergocalciferol (ERGOCALCIFEROL) 90513 UNITS capsule Take 50,000 Units by mouth once a week      Probiotic Product (PROBIOTIC ADVANCED PO) Take by mouth daily       Albuterol Sulfate (PROAIR HFA IN) Inhale  into the lungs as needed. No current facility-administered medications for this visit. Allergies: Latex, Tramadol, Codeine, and Marcaine [bupivacaine hcl]    Review of Systems  Review of Systems   Constitutional: Negative for activity change, diaphoresis, fatigue, fever and unexpected weight change. HENT: Negative for facial swelling and nosebleeds. Eyes: Negative for redness and visual disturbance. Respiratory: Positive for shortness of breath. Negative for cough, chest tightness and wheezing. Cardiovascular: Negative for chest pain, palpitations and leg swelling. Gastrointestinal: Negative for abdominal pain, nausea and vomiting. Endocrine: Negative for cold intolerance and heat intolerance. Genitourinary: Negative for dysuria and hematuria. Musculoskeletal: Negative for arthralgias and myalgias. Skin: Negative for pallor and rash. Neurological: Negative for dizziness, seizures, syncope, weakness and light-headedness. Hematological: Does not bruise/bleed easily. Psychiatric/Behavioral: Negative for agitation. The patient is not nervous/anxious. Objective  Vital Signs - /62   Pulse 88   Ht 5' 5\" (1.651 m)   Wt 161 lb (73 kg)   BMI 26.79 kg/m²    Wt Readings from Last 3 Encounters:   02/23/21 161 lb (73 kg)   02/08/21 163 lb (73.9 kg)   04/27/20 172 lb (78 kg)      Physical Exam  Vitals signs and nursing note reviewed.    Constitutional: General: She is not in acute distress. Appearance: She is well-developed. She is not diaphoretic. HENT:      Head: Normocephalic and atraumatic. Right Ear: Hearing and external ear normal.      Left Ear: Hearing and external ear normal.      Nose: Nose normal.   Eyes:      General:         Right eye: No discharge. Left eye: No discharge. Pupils: Pupils are equal, round, and reactive to light. Neck:      Musculoskeletal: Neck supple. No muscular tenderness. Thyroid: No thyromegaly. Vascular: No carotid bruit or JVD. Trachea: No tracheal deviation. Cardiovascular:      Rate and Rhythm: Normal rate and regular rhythm. Heart sounds: Normal heart sounds. No murmur. No friction rub. No gallop. Pulmonary:      Effort: Pulmonary effort is normal. No respiratory distress. Breath sounds: Normal breath sounds. No wheezing or rales. Abdominal:      Palpations: Abdomen is soft. Tenderness: There is no abdominal tenderness. Musculoskeletal:         General: No swelling or deformity. Comments: Normal gait and station   Skin:     General: Skin is warm and dry. Findings: No rash. Neurological:      General: No focal deficit present. Mental Status: She is alert and oriented to person, place, and time. Cranial Nerves: No cranial nerve deficit. Psychiatric:         Mood and Affect: Mood normal.         Behavior: Behavior normal.         Judgment: Judgment normal.         Data:  Echo 2019  Conclusions    Summary   Structurally normal mitral valve with normal leaflet mobility. No evidence   of mitral valve stenosis, trivial mitral regurgitation. Aortic valve appears to be tricuspid. Structurally normal aortic valve. No significant aortic regurgitation or stenosis is noted. Tricuspid valve is structurally normal.   Mild tricuspid regurgitation with estimated RVSP of 33 mm Hg. Normal left ventricular size with preserved LV function and an estimated   ejection fraction of approximately 55-60%. Diastolic dysfunction in   present. Assessment:     Diagnosis Orders   1. SOB (shortness of breath)     2. Paroxysmal atrial fibrillation (HCC)     3. Essential hypertension     4. Status post placement of implantable loop recorder         Shortness of breath-without signs of fluid overload. Possible angina equivalent. Awaiting results of nuclear stress test done today. Will call patient. We discussed that if the stress test was abnormal, would consider heart catheterization. We reviewed this procedure we discussed risks and benefits    Paroxysmal atrial fibrillationsingle, brief episode in September 2020 without recurrence    Hypertensionstable on current regimen    Loop recorder/syncope  Interrogate deviceno episodes    Stable cardiovascular status. No evidence of overt heart failure,angina or dysrhythmia. Plan    Return in about 6 months (around 8/23/2021) for KRISTIAN. Will call with Radha Wiley results- call dtr Tamara Cantrell [phone]    Call with any questionsor concerns  Follow up with Lois Shelby for non cardiac problems  Report any new problems  Cardiovascular Fitness-Exercise as tolerated. Strive for 15 minutes of exercise most days of the week. Cardiac / HealthyDiet  Continue current medications as directed  Continue plan of treatment  It is always recommended that you bring your medicationsbottles with you to each visit - this is for your safety!        KRISTIAN Cifuentes

## 2021-02-24 ENCOUNTER — TELEPHONE (OUTPATIENT)
Dept: CARDIOLOGY CLINIC | Age: 81
End: 2021-02-24

## 2021-02-24 LAB
LV EF: 77 %
LVEF MODALITY: NORMAL

## 2021-02-24 NOTE — TELEPHONE ENCOUNTER
Please have Dr. Syed Herman review nuclear stress test he ordered for this patient that was completed on 2/23/2021. She came to the office yesterday after having the test done to review results, however had not been read. Patient is complaining of persistent shortness of breath and also has asthma. Stress test showed possible small area of apical lateral infarct ischemia versus attenuation artifact. Please advise if heart cath is recommended. Please communicate response to pt and myself. Thank you!

## 2021-02-24 NOTE — TELEPHONE ENCOUNTER
Impression:   There is a possible small area of apical lateral infarct/ischemia   versus attenuation artifact, with a calculated ejection fraction of 77   %. Suggest: Clinical correlation is advised with further evaluation if   significant symptomatology. LV function is preserved with small area   of defect in the apical distribution suggesting a low risk study.    Signed by Dr Conrado Opitz on 2/23/2021 4:52 PM

## 2021-03-10 ENCOUNTER — OFFICE VISIT (OUTPATIENT)
Dept: CARDIOLOGY CLINIC | Age: 81
End: 2021-03-10
Payer: MEDICARE

## 2021-03-10 VITALS
SYSTOLIC BLOOD PRESSURE: 124 MMHG | HEART RATE: 68 BPM | HEIGHT: 65 IN | DIASTOLIC BLOOD PRESSURE: 62 MMHG | BODY MASS INDEX: 25.33 KG/M2 | WEIGHT: 152 LBS

## 2021-03-10 DIAGNOSIS — R06.09 DOE (DYSPNEA ON EXERTION): Primary | ICD-10-CM

## 2021-03-10 PROCEDURE — 4040F PNEUMOC VAC/ADMIN/RCVD: CPT | Performed by: INTERNAL MEDICINE

## 2021-03-10 PROCEDURE — 99212 OFFICE O/P EST SF 10 MIN: CPT | Performed by: INTERNAL MEDICINE

## 2021-03-10 PROCEDURE — G8400 PT W/DXA NO RESULTS DOC: HCPCS | Performed by: INTERNAL MEDICINE

## 2021-03-10 PROCEDURE — 1090F PRES/ABSN URINE INCON ASSESS: CPT | Performed by: INTERNAL MEDICINE

## 2021-03-10 PROCEDURE — G8427 DOCREV CUR MEDS BY ELIG CLIN: HCPCS | Performed by: INTERNAL MEDICINE

## 2021-03-10 PROCEDURE — G8417 CALC BMI ABV UP PARAM F/U: HCPCS | Performed by: INTERNAL MEDICINE

## 2021-03-10 PROCEDURE — 1123F ACP DISCUSS/DSCN MKR DOCD: CPT | Performed by: INTERNAL MEDICINE

## 2021-03-10 PROCEDURE — 1036F TOBACCO NON-USER: CPT | Performed by: INTERNAL MEDICINE

## 2021-03-10 PROCEDURE — G8484 FLU IMMUNIZE NO ADMIN: HCPCS | Performed by: INTERNAL MEDICINE

## 2021-03-10 NOTE — PROGRESS NOTES
HISTORY  80-year-old lady with history of hypertension, lower extremity edema, paroxysmal atrial fibrillation, and significant exertional dyspnea returns for follow-up discussion of her nuclear stress test.  She complains of significant change in her exercise capacity with dyspnea on exertion over the past several months. She has been evaluated by pulmonologist who is found no basis for her symptoms. Her September 2019 echocardiogram revealed normal left ventricular systolic function, mild diastolic dysfunction, and no evidence of valvular disease. Her Lexiscan dual-isotope was interpreted as showing a possible small area of apical lateral infarct with ischemia with preserved systolic function. PHYSICAL EXAM  On return today she carries 152 pounds in a 5 foot 5 inch frame. Pressure is 124/62 with pulse of 68. ASSESSMENT/PLAN:   1. Dyspnea on exertion/equivocal Lexiscan dual-isotope - discussed these results at length with the patient and her daughter. In light of the fact that pulmonology found no basis for her symptoms, they feel the need for further clarification. Since her perfusion study did not exclude ischemic basis, we will schedule her for outpatient angiography. We discussed the situation at some length.

## 2021-03-10 NOTE — PATIENT INSTRUCTIONS
Drink plenty of fluids for several hours after the test.  Follow-up care is a key part of your treatment and safety. Be sure to make and go to all appointments, and call your doctor if you are having problems. It's also a good idea to know your test results and keep a list of the medicines you take. Where can you learn more? Go to [email or web address] and sign in to your [employer] account. Enter W306 in the rimidi box to learn more about \"Left Heart Catheterization: About This Test.\"     If you do not have an account, please click on the \"Sign Up Now\" link. Current as of: December 16, 2019               Content Version: 12.6  © 0679-1803 [email or web address]. Care instructions adapted under license by [facility] ([facility]). If you have questions about a medical condition or this instruction, always ask your healthcare professional. [address] any warranty or liability for your use of this information.

## 2021-03-11 DIAGNOSIS — I10 ESSENTIAL HYPERTENSION: ICD-10-CM

## 2021-03-11 DIAGNOSIS — Z95.818 STATUS POST PLACEMENT OF IMPLANTABLE LOOP RECORDER: Primary | ICD-10-CM

## 2021-03-11 DIAGNOSIS — R00.2 PALPITATIONS: ICD-10-CM

## 2021-03-12 ENCOUNTER — TELEPHONE (OUTPATIENT)
Dept: CARDIOLOGY CLINIC | Age: 81
End: 2021-03-12

## 2021-03-12 DIAGNOSIS — R06.09 DOE (DYSPNEA ON EXERTION): Primary | ICD-10-CM

## 2021-03-12 NOTE — TELEPHONE ENCOUNTER
Called and spoke with patient, [address] scheduled for 03/23/2021 at 1000 with arrival of 0800. Patient is to be NPO after midnight. Patient instructed to arrive through front entrance of hospital and make immediate left. Patient advised they can have one person with them but they both must wear a mask. Patient advised may take morning medications with sip of water. Also advised patient must have COVID testing completed on 03/19/2021 anywhere from 800-1100 am at [facility]. Advised patient that they will be able to proceed with procedure as long as test results are negative. Patient made aware that if testing is not resulted evening prior to procedure that they may have to be rescheduled and possibly retested. Given instructions on where to go and to self quarantine between testing and procedure. Patient does not have IV dye allergy. Patient verbally understood. Called and spoke to Shubham in cath lab on 03/15/2021 and scheduled procedure.

## 2021-03-17 NOTE — TELEPHONE ENCOUNTER
Patient left message wanting to know if she could have labs and covid test done locally.  She can be reached at [phone]

## 2021-03-19 ENCOUNTER — OFFICE VISIT (OUTPATIENT)
Age: 81
End: 2021-03-19

## 2021-03-19 VITALS — OXYGEN SATURATION: 95 % | HEART RATE: 75 BPM

## 2021-03-19 DIAGNOSIS — R06.09 DOE (DYSPNEA ON EXERTION): ICD-10-CM

## 2021-03-19 DIAGNOSIS — Z11.59 SCREENING FOR VIRAL DISEASE: Primary | ICD-10-CM

## 2021-03-19 LAB
ALBUMIN SERPL-MCNC: 4.2 G/DL (ref 3.5–5.2)
ALP BLD-CCNC: 113 U/L (ref 35–104)
ALT SERPL-CCNC: 21 U/L (ref 5–33)
ANION GAP SERPL CALCULATED.3IONS-SCNC: 10 MMOL/L (ref 7–19)
AST SERPL-CCNC: 18 U/L (ref 5–32)
BILIRUB SERPL-MCNC: <0.2 MG/DL (ref 0.2–1.2)
BUN BLDV-MCNC: 26 MG/DL (ref 8–23)
CALCIUM SERPL-MCNC: 9.7 MG/DL (ref 8.8–10.2)
CHLORIDE BLD-SCNC: 105 MMOL/L (ref 98–111)
CO2: 28 MMOL/L (ref 22–29)
CREAT SERPL-MCNC: 0.8 MG/DL (ref 0.5–0.9)
GFR AFRICAN AMERICAN: >59
GFR NON-AFRICAN AMERICAN: >60
GLUCOSE BLD-MCNC: 98 MG/DL (ref 74–109)
HCT VFR BLD CALC: 38.3 % (ref 37–47)
HEMOGLOBIN: 11.5 G/DL (ref 12–16)
MCH RBC QN AUTO: 27.4 PG (ref 27–31)
MCHC RBC AUTO-ENTMCNC: 30 G/DL (ref 33–37)
MCV RBC AUTO: 91.4 FL (ref 81–99)
PDW BLD-RTO: 13.1 % (ref 11.5–14.5)
PLATELET # BLD: 259 K/UL (ref 130–400)
PMV BLD AUTO: 10.7 FL (ref 9.4–12.3)
POTASSIUM SERPL-SCNC: 5.1 MMOL/L (ref 3.5–5)
RBC # BLD: 4.19 M/UL (ref 4.2–5.4)
SARS-COV-2, PCR: NOT DETECTED
SODIUM BLD-SCNC: 143 MMOL/L (ref 136–145)
TOTAL PROTEIN: 6.7 G/DL (ref 6.6–8.7)
WBC # BLD: 7.4 K/UL (ref 4.8–10.8)

## 2021-03-19 PROCEDURE — 99999 PR OFFICE/OUTPT VISIT,PROCEDURE ONLY: CPT | Performed by: NURSE PRACTITIONER

## 2021-03-23 ENCOUNTER — HOSPITAL ENCOUNTER (OUTPATIENT)
Dept: CARDIAC CATH/INVASIVE PROCEDURES | Age: 81
Discharge: HOME | End: 2021-03-23
Attending: INTERNAL MEDICINE | Admitting: INTERNAL MEDICINE
Payer: MEDICARE

## 2021-03-23 VITALS
DIASTOLIC BLOOD PRESSURE: 86 MMHG | WEIGHT: 155 LBS | OXYGEN SATURATION: 97 % | HEART RATE: 93 BPM | RESPIRATION RATE: 29 BRPM | BODY MASS INDEX: 25.83 KG/M2 | HEIGHT: 65 IN | TEMPERATURE: 97.6 F | SYSTOLIC BLOOD PRESSURE: 115 MMHG

## 2021-03-23 LAB
EKG P AXIS: 59 DEGREES
EKG P-R INTERVAL: 126 MS
EKG Q-T INTERVAL: 404 MS
EKG QRS DURATION: 94 MS
EKG QTC CALCULATION (BAZETT): 411 MS
EKG T AXIS: 48 DEGREES
POTASSIUM SERPL-SCNC: 4.6 MMOL/L (ref 3.5–5)

## 2021-03-23 PROCEDURE — 2709999900 HC NON-CHARGEABLE SUPPLY

## 2021-03-23 PROCEDURE — 6370000000 HC RX 637 (ALT 250 FOR IP): Performed by: INTERNAL MEDICINE

## 2021-03-23 PROCEDURE — 93005 ELECTROCARDIOGRAM TRACING: CPT | Performed by: INTERNAL MEDICINE

## 2021-03-23 PROCEDURE — 93458 L HRT ARTERY/VENTRICLE ANGIO: CPT

## 2021-03-23 PROCEDURE — C1887 CATHETER, GUIDING: HCPCS

## 2021-03-23 PROCEDURE — 6360000004 HC RX CONTRAST MEDICATION: Performed by: INTERNAL MEDICINE

## 2021-03-23 PROCEDURE — C1894 INTRO/SHEATH, NON-LASER: HCPCS

## 2021-03-23 PROCEDURE — 2500000003 HC RX 250 WO HCPCS

## 2021-03-23 PROCEDURE — 93458 L HRT ARTERY/VENTRICLE ANGIO: CPT | Performed by: INTERNAL MEDICINE

## 2021-03-23 PROCEDURE — 84132 ASSAY OF SERUM POTASSIUM: CPT

## 2021-03-23 PROCEDURE — C1769 GUIDE WIRE: HCPCS

## 2021-03-23 PROCEDURE — 99152 MOD SED SAME PHYS/QHP 5/>YRS: CPT | Performed by: INTERNAL MEDICINE

## 2021-03-23 PROCEDURE — 6360000002 HC RX W HCPCS

## 2021-03-23 PROCEDURE — 2580000003 HC RX 258: Performed by: INTERNAL MEDICINE

## 2021-03-23 PROCEDURE — 93010 ELECTROCARDIOGRAM REPORT: CPT | Performed by: INTERNAL MEDICINE

## 2021-03-23 PROCEDURE — 36415 COLL VENOUS BLD VENIPUNCTURE: CPT

## 2021-03-23 PROCEDURE — 99152 MOD SED SAME PHYS/QHP 5/>YRS: CPT

## 2021-03-23 RX ORDER — ASPIRIN 325 MG
325 TABLET ORAL ONCE
Status: COMPLETED | OUTPATIENT
Start: 2021-03-23 | End: 2021-03-23

## 2021-03-23 RX ORDER — METOPROLOL SUCCINATE 25 MG/1
50 TABLET, EXTENDED RELEASE ORAL DAILY
Qty: 90 TABLET | Refills: 3 | Status: SHIPPED | OUTPATIENT
Start: 2021-03-23 | End: 2021-07-03 | Stop reason: SDUPTHER

## 2021-03-23 RX ORDER — SODIUM CHLORIDE 0.9 % (FLUSH) 0.9 %
10 SYRINGE (ML) INJECTION PRN
Status: DISCONTINUED | OUTPATIENT
Start: 2021-03-23 | End: 2021-03-23 | Stop reason: HOSPADM

## 2021-03-23 RX ORDER — NITROGLYCERIN 0.4 MG/1
0.4 TABLET SUBLINGUAL EVERY 5 MIN PRN
Status: DISCONTINUED | OUTPATIENT
Start: 2021-03-23 | End: 2021-03-23 | Stop reason: HOSPADM

## 2021-03-23 RX ORDER — SODIUM CHLORIDE 9 MG/ML
INJECTION, SOLUTION INTRAVENOUS CONTINUOUS
Status: DISCONTINUED | OUTPATIENT
Start: 2021-03-23 | End: 2021-03-23 | Stop reason: HOSPADM

## 2021-03-23 RX ORDER — ACETAMINOPHEN 325 MG/1
650 TABLET ORAL EVERY 4 HOURS PRN
Status: DISCONTINUED | OUTPATIENT
Start: 2021-03-23 | End: 2021-03-23 | Stop reason: HOSPADM

## 2021-03-23 RX ORDER — SODIUM CHLORIDE 0.9 % (FLUSH) 0.9 %
10 SYRINGE (ML) INJECTION EVERY 12 HOURS SCHEDULED
Status: DISCONTINUED | OUTPATIENT
Start: 2021-03-23 | End: 2021-03-23 | Stop reason: HOSPADM

## 2021-03-23 RX ORDER — ONDANSETRON 2 MG/ML
4 INJECTION INTRAMUSCULAR; INTRAVENOUS EVERY 6 HOURS PRN
Status: DISCONTINUED | OUTPATIENT
Start: 2021-03-23 | End: 2021-03-23 | Stop reason: HOSPADM

## 2021-03-23 RX ORDER — VALSARTAN 80 MG/1
80 TABLET ORAL DAILY
Qty: 90 TABLET | Refills: 3 | Status: SHIPPED | OUTPATIENT
Start: 2021-03-23

## 2021-03-23 RX ORDER — SODIUM CHLORIDE 9 MG/ML
INJECTION, SOLUTION INTRAVENOUS CONTINUOUS
Status: DISCONTINUED | OUTPATIENT
Start: 2021-03-23 | End: 2021-03-23 | Stop reason: SDUPTHER

## 2021-03-23 RX ADMIN — IOPAMIDOL 90 ML: 612 INJECTION, SOLUTION INTRAVENOUS at 11:46

## 2021-03-23 RX ADMIN — SODIUM CHLORIDE: 9 INJECTION, SOLUTION INTRAVENOUS at 08:39

## 2021-03-23 RX ADMIN — ASPIRIN 325 MG: 325 TABLET, FILM COATED ORAL at 08:39

## 2021-03-23 ASSESSMENT — ENCOUNTER SYMPTOMS
CONSTIPATION: 0
BLOOD IN STOOL: 0
BACK PAIN: 0
ABDOMINAL DISTENTION: 0
WHEEZING: 0
COUGH: 0
DIARRHEA: 0
EYE DISCHARGE: 0
VOMITING: 0
SHORTNESS OF BREATH: 1

## 2021-03-23 NOTE — H&P
Patient:  Haritha Beavers                  1940  MRN: [id number]    PROBLEM LIST:    Patient Active Problem List    Diagnosis Date Noted    Postural dizziness with presyncope      Priority: High    Status post placement of implantable loop recorder 01/04/2018     Priority: Low    Syncope 01/04/2018     Priority: Low    Essential hypertension 11/21/2017     Priority: Low    Tachycardia 11/21/2017     Priority: Low     Overview Note:     3/7/14 DSE negative for myocardial ischemia  3/17/14 Echo normal LVFX, EF 50%  12/18/17 Echo normal LVFX, EF 55-60%  12/18/17 Cynthia negative for myocardial ischemia EF 64%  1/4/18  Reveal implanted  9/17/19 Echo normal LVFX, EF 55-60%        Current use of proton pump inhibitor 06/16/2017     Priority: Low    LLQ abdominal pain 05/31/2016     Priority: Low    Tortuous colon 05/31/2016     Priority: Low    Biatrial enlargement 04/16/2014     Priority: Low    Mild mitral regurgitation 04/16/2014     Priority: Low    SOB (shortness of breath) 03/11/2014     Priority: Low    Migraine 01/24/2014     Priority: Low    History of traumatic brain injury 01/24/2014     Priority: Low    GERD (gastroesophageal reflux disease) 02/14/2012     Priority: Low    Diverticulosis 02/14/2012     Priority: Low    History of adenomatous polyp of colon 11/07/2011     Priority: Low    Epigastric pain 11/07/2011     Priority: Low       PRESENTATION: Haritha Beavers is a [de-identified]y.o. year old female who presents with complaints of progressive exertional shortness of breath with negative pulmonary work-up, normal LV ejection fraction, hypertension, paroxysmal atrial fibrillation with equivocal Lexiscan study for possible small apical lateral infarct/ischemia with ongoing symptoms referred by Dr. Yolanda Christy for cardiac catheterization. REVIEW OF SYSTEMS:  Review of Systems   Constitutional: Negative for activity change, fatigue and fever. HENT: Negative for ear pain, hearing loss and tinnitus.     Eyes: Negative for discharge and visual disturbance. Respiratory: Positive for shortness of breath. Negative for cough and wheezing. Cardiovascular: Negative for chest pain, palpitations and leg swelling. Gastrointestinal: Negative for abdominal distention, blood in stool, constipation, diarrhea and vomiting. Endocrine: Negative for cold intolerance, heat intolerance, polydipsia and polyuria. Genitourinary: Negative for dysuria and hematuria. Musculoskeletal: Negative for arthralgias, back pain and myalgias. Skin: Negative for pallor and rash. Neurological: Negative for seizures, syncope, weakness and headaches. Psychiatric/Behavioral: Negative for behavioral problems and dysphoric mood. Past Medical History:      Diagnosis Date    Abdominal pain, other specified site     Asthma     Cataract     COVID-19 2020    GERD (gastroesophageal reflux disease)     Glaucoma     Headache     Status post placement of implantable loop recorder 2018    Vitamin D deficiency        Past Surgical History:      Procedure Laterality Date    APPENDECTOMY      CATARACT REMOVAL      CERVICAL DISC SURGERY       SECTION      CHOLECYSTECTOMY      COLONOSCOPY  2009    Dr Anthony Holt    COLONOSCOPY  2012    Dr Isabella Rust GASTROINTESTINAL ENDOSCOPY  2009    Dr Arlette Singh ENDOSCOPY  2012    Dr Anthony Holt       Medications Prior to Admission:    Prior to Admission medications    Medication Sig Start Date End Date Taking?  Authorizing Provider   fluticasone-umeclidin-vilant (TRELEGY ELLIPTA) 100-62.5-25 MCG/INH AEPB Inhale 1 puff into the lungs daily    Historical Provider, MD   OXYGEN Inhale into the lungs nightly     Historical Provider, MD   diclofenac sodium 1 % GEL 2 g as needed  17   Historical Provider, MD   furosemide (LASIX) 40 MG tablet Take 40 mg by mouth as needed 12/27/18   Historical Provider, MD   esomeprazole Magnesium (NEXIUM) 40 MG PACK Take 40 mg by mouth 2 times daily     Historical Provider, MD   escitalopram (LEXAPRO) 10 MG tablet Take 10 mg by mouth daily  4/25/18 2/23/21  Historical Provider, MD   levothyroxine (SYNTHROID) 50 MCG tablet Take 50 mcg by mouth Daily    Historical Provider, MD   ergocalciferol (ERGOCALCIFEROL) 08042 UNITS capsule Take 50,000 Units by mouth once a week    Historical Provider, MD   Probiotic Product (PROBIOTIC ADVANCED PO) Take by mouth daily     Historical Provider, MD   Albuterol Sulfate (PROAIR HFA IN) Inhale  into the lungs as needed.     Historical Provider, MD       Allergies:  Latex, Tramadol, Codeine, and Marcaine [bupivacaine hcl]    Past Social History:  Social History     Socioeconomic History    Marital status:      Spouse name: Not on file    Number of children: Not on file    Years of education: Not on file    Highest education level: Not on file   Occupational History    Not on file   Social Needs    Financial resource strain: Not on file    Food insecurity     Worry: Not on file     Inability: Not on file    Transportation needs     Medical: Not on file     Non-medical: Not on file   Tobacco Use    Smoking status: Never Smoker    Smokeless tobacco: Never Used   Substance and Sexual Activity    Alcohol use: No    Drug use: No    Sexual activity: Not on file   Lifestyle    Physical activity     Days per week: Not on file     Minutes per session: Not on file    Stress: Not on file   Relationships    Social connections     Talks on phone: Not on file     Gets together: Not on file     Attends [employer] service: Not on file     Active member of club or organization: Not on file     Attends meetings of clubs or organizations: Not on file     Relationship status: Not on file    Intimate partner violence     Fear of current or ex partner: Not on file     Emotionally abused: Not on file     Physically abused: Not on file     Forced sexual activity: Not on file   Other Topics Concern    Not on file   Social History Narrative    Not on file       Family History:       Problem Relation Age of Onset    Cancer Sister         breast cancer    Heart Disease Brother     Heart Disease Brother     Colon Polyps Neg Hx     COPD Neg Hx     Esophageal Cancer Neg Hx     Liver Cancer Neg Hx     Liver Disease Neg Hx     Rectal Cancer Neg Hx     Stomach Cancer Neg Hx      Physical Exam:    Vitals: There were no vitals taken for this visit. 24HR INTAKE/OUTPUT:  No intake or output data in the 24 hours ending 03/23/21 0804    Physical Exam  Constitutional:       General: She is not in acute distress. Appearance: She is not diaphoretic. HENT:      Mouth/Throat:      Pharynx: No oropharyngeal exudate. Eyes:      General: No scleral icterus. Right eye: No discharge. Left eye: No discharge. Neck:      Thyroid: No thyromegaly. Vascular: No JVD. Cardiovascular:      Rate and Rhythm: Normal rate and regular rhythm. No extrasystoles are present. Heart sounds: Normal heart sounds, S1 normal and S2 normal. No murmur. No systolic murmur. No diastolic murmur. No friction rub. No gallop. No S3 or S4 sounds. Pulmonary:      Effort: Pulmonary effort is normal. No respiratory distress. Breath sounds: Normal breath sounds. No wheezing or rales. Chest:      Chest wall: No tenderness. Abdominal:      General: Bowel sounds are normal. There is no distension. Palpations: Abdomen is soft. There is no mass. Tenderness: There is no abdominal tenderness. There is no guarding or rebound. Hernia: No hernia is present. Musculoskeletal: Normal range of motion. Skin:     General: Skin is warm. Coloration: Skin is not pale. Findings: No rash. Neurological:      Mental Status: She is alert and oriented to person, place, and time.       Cranial Nerves: No cranial nerve catheterization/PCI discussed with the patient and full informed consent obtained.   Acceptable Mallampati score  Consent for moderate conscious sedation  ASA 3            Electronically signed by Alessandro Martinez MD on 3/23/2021 at 8:04 AM

## 2021-03-23 NOTE — PROGRESS NOTES
Patient ambulated in room without difficulty. Arm place in a sling. Discharge instructions given. Patient and daughter voiced understanding.

## 2021-04-20 ENCOUNTER — OFFICE VISIT (OUTPATIENT)
Dept: CARDIOLOGY CLINIC | Age: 81
End: 2021-04-20
Payer: MEDICARE

## 2021-04-20 VITALS
WEIGHT: 154 LBS | OXYGEN SATURATION: 98 % | HEIGHT: 65 IN | BODY MASS INDEX: 25.66 KG/M2 | SYSTOLIC BLOOD PRESSURE: 122 MMHG | DIASTOLIC BLOOD PRESSURE: 62 MMHG | HEART RATE: 80 BPM

## 2021-04-20 DIAGNOSIS — I48.0 PAF (PAROXYSMAL ATRIAL FIBRILLATION) (HCC): ICD-10-CM

## 2021-04-20 DIAGNOSIS — I10 ESSENTIAL HYPERTENSION: Primary | ICD-10-CM

## 2021-04-20 PROCEDURE — G8400 PT W/DXA NO RESULTS DOC: HCPCS | Performed by: NURSE PRACTITIONER

## 2021-04-20 PROCEDURE — G8417 CALC BMI ABV UP PARAM F/U: HCPCS | Performed by: NURSE PRACTITIONER

## 2021-04-20 PROCEDURE — 1090F PRES/ABSN URINE INCON ASSESS: CPT | Performed by: NURSE PRACTITIONER

## 2021-04-20 PROCEDURE — 4040F PNEUMOC VAC/ADMIN/RCVD: CPT | Performed by: NURSE PRACTITIONER

## 2021-04-20 PROCEDURE — 99213 OFFICE O/P EST LOW 20 MIN: CPT | Performed by: NURSE PRACTITIONER

## 2021-04-20 PROCEDURE — G8427 DOCREV CUR MEDS BY ELIG CLIN: HCPCS | Performed by: NURSE PRACTITIONER

## 2021-04-20 PROCEDURE — 1123F ACP DISCUSS/DSCN MKR DOCD: CPT | Performed by: NURSE PRACTITIONER

## 2021-04-20 PROCEDURE — 1036F TOBACCO NON-USER: CPT | Performed by: NURSE PRACTITIONER

## 2021-04-20 ASSESSMENT — ENCOUNTER SYMPTOMS
SHORTNESS OF BREATH: 1
COUGH: 0
CHEST TIGHTNESS: 0
WHEEZING: 0
SORE THROAT: 0

## 2021-04-20 NOTE — PROGRESS NOTES
Migraine 01/24/2014     Priority: Low    History of traumatic brain injury 01/24/2014     Priority: Low    GERD (gastroesophageal reflux disease) 02/14/2012     Priority: Low    Diverticulosis 02/14/2012     Priority: Low    History of adenomatous polyp of colon 11/07/2011     Priority: Low    Epigastric pain 11/07/2011     Priority: Low    Status post placement of implantable loop recorder 01/04/2018    Syncope 01/04/2018     Current Outpatient Medications   Medication Sig Dispense Refill    metoprolol succinate (TOPROL XL) 25 MG extended release tablet Take 2 tablets by mouth daily 90 tablet 3    valsartan (DIOVAN) 80 MG tablet Take 1 tablet by mouth daily 90 tablet 3    fluticasone-umeclidin-vilant (TRELEGY ELLIPTA) 100-62.5-25 MCG/INH AEPB Inhale 1 puff into the lungs daily      OXYGEN Inhale into the lungs nightly As needed      diclofenac sodium 1 % GEL 2 g as needed       furosemide (LASIX) 40 MG tablet Take 40 mg by mouth as needed       esomeprazole Magnesium (NEXIUM) 40 MG PACK Take 40 mg by mouth 2 times daily       escitalopram (LEXAPRO) 10 MG tablet Take 10 mg by mouth daily       levothyroxine (SYNTHROID) 50 MCG tablet Take 50 mcg by mouth Daily      ergocalciferol (ERGOCALCIFEROL) 32646 UNITS capsule Take 50,000 Units by mouth once a week      Probiotic Product (PROBIOTIC ADVANCED PO) Take by mouth daily       Albuterol Sulfate (PROAIR HFA IN) Inhale  into the lungs as needed. No current facility-administered medications for this visit.       Allergies: Latex, Tramadol, Codeine, and Marcaine [bupivacaine hcl]  Past Medical History:   Diagnosis Date    Abdominal pain, other specified site     Asthma     Cataract     COVID-19 12/2020    GERD (gastroesophageal reflux disease)     Glaucoma     Headache     Hx of blood clots     Hypertension     Status post placement of implantable loop recorder 01/04/2018    Thyroid disease     Vitamin D deficiency      Past Surgical History:   Procedure Laterality Date    APPENDECTOMY      CATARACT REMOVAL      CERVICAL DISC SURGERY       SECTION      CHOLECYSTECTOMY      COLONOSCOPY  2009    Dr Tracey Kate COLONOSCOPY  2012    Dr Dayana Martin ENDOSCOPY  2009    Dr General Johnson ENDOSCOPY  2012    Dr Yanira Gerard     Family History   Problem Relation Age of Onset    Cancer Sister         breast cancer    Heart Disease Brother     Heart Disease Brother     Colon Polyps Neg Hx     COPD Neg Hx     Esophageal Cancer Neg Hx     Liver Cancer Neg Hx     Liver Disease Neg Hx     Rectal Cancer Neg Hx     Stomach Cancer Neg Hx      Social History     Tobacco Use    Smoking status: Never Smoker    Smokeless tobacco: Never Used   Substance Use Topics    Alcohol use: No          Review of Systems:    Review of Systems   Constitutional: Negative for activity change, chills, diaphoresis, fatigue and fever. HENT: Negative for congestion and sore throat. Respiratory: Positive for shortness of breath (baseline - no change). Negative for cough, chest tightness and wheezing. Cardiovascular: Negative for chest pain, palpitations and leg swelling. Musculoskeletal: Negative. Skin: Negative. Neurological: Negative for dizziness, syncope, light-headedness and headaches. Psychiatric/Behavioral: Negative for confusion. The patient is not nervous/anxious.          Objective:    /62 (Site: Left Upper Arm, Position: Sitting, Cuff Size: Medium Adult)   Pulse 80   Ht 5' 5\" (1.651 m)   Wt 154 lb (69.9 kg)   SpO2 98%   BMI 25.63 kg/m²     GENERAL - well developed and well nourished, conversant  HEENT   PERRLA, Hearing appears normal, gentleman lids are normal.  External inspection of ears and nose appear normal.  NECK - no thyromegaly, no JVD, trachea is in the midline  CARDIOVASCULAR  PMI is in the mid line clavicular position, Normal S1 and S2 with no systolic murmur. No S3 or S4    PULMONARY  no respiratory distress. No wheezes or rales. Lungs are clear to ausculation, normal respiratory effort. ABDOMEN   soft, non tender, no rebound  MUSCULOSKELETAL   range of motion of the upper and lower extermites appears normal and equal and is without pain   EXTREMITIES - no significant edema   NEUROLOGIC  gait and station are normal  SKIN - turgor is normal, can warm and dry. PSYCHIATRIC - normal mood and affect, alert and orientated x 3,      ASSESSMENT:    ALL THE CARDIOLOGY PROBLEMS ARE LISTED ABOVE; HOWEVER, THE FOLLOWING SPECIFIC CARDIAC PROBLEMS / CONDITIONS WERE ADDRESSED AND TREATED DURING THE OFFICE VISIT TODAY:                                                                                            MEDICAL DECISION MAKING             Cardiac Specific Problem / Diagnosis  Discussion and Data Reviewed Diagnostic Procedures Ordered Management Options Selected           1. Hypertension   Well Controlled   Review and summation of old records:    Blood pressure in the office today 122/62. O2 sat 98%. Patient is on Diovan 80 mg daily. Toprol-XL 50 mg daily. No Continue current medications:     Yes           2. PAF  Stable   No recurrence. Patient is on Toprol-XL 50 mg daily. No Continue current medications: Yes                                     No orders of the defined types were placed in this encounter. No orders of the defined types were placed in this encounter. Discussed with patient. Return in about 6 months (around 10/20/2021) for Dr Modesta Aviles . I greatly appreciate the opportunity to meet Shari Higgins and your confidence in allowing me to participate in her cardiovascular care. Kevin Nash, KRISTIAN - ANA  4/20/2021 2:00 PM CDT                    This dictation was generated by voice recognition computer software.  Although all attempts are made to edit dictation for accuracy, there may be errors in the transcription that are not intended.

## 2021-04-22 DIAGNOSIS — Z95.818 STATUS POST PLACEMENT OF IMPLANTABLE LOOP RECORDER: Primary | ICD-10-CM

## 2021-04-22 DIAGNOSIS — R00.2 PALPITATIONS: ICD-10-CM

## 2021-04-22 DIAGNOSIS — I10 ESSENTIAL HYPERTENSION: ICD-10-CM

## 2021-04-22 PROCEDURE — 93298 REM INTERROG DEV EVAL SCRMS: CPT | Performed by: NURSE PRACTITIONER

## 2021-04-22 PROCEDURE — G2066 INTER DEVC REMOTE 30D: HCPCS | Performed by: NURSE PRACTITIONER

## 2021-06-03 DIAGNOSIS — Z95.818 STATUS POST PLACEMENT OF IMPLANTABLE LOOP RECORDER: Primary | ICD-10-CM

## 2021-06-03 DIAGNOSIS — R00.2 PALPITATIONS: ICD-10-CM

## 2021-06-03 DIAGNOSIS — I10 ESSENTIAL HYPERTENSION: ICD-10-CM

## 2021-06-04 PROCEDURE — 93298 REM INTERROG DEV EVAL SCRMS: CPT | Performed by: NURSE PRACTITIONER

## 2021-06-04 PROCEDURE — G2066 INTER DEVC REMOTE 30D: HCPCS | Performed by: NURSE PRACTITIONER

## 2021-06-23 ENCOUNTER — TELEPHONE (OUTPATIENT)
Dept: CARDIOLOGY CLINIC | Age: 81
End: 2021-06-23

## 2021-06-23 DIAGNOSIS — Z95.818 STATUS POST PLACEMENT OF IMPLANTABLE LOOP RECORDER: Primary | ICD-10-CM

## 2021-06-23 DIAGNOSIS — R00.2 PALPITATIONS: ICD-10-CM

## 2021-06-23 NOTE — TELEPHONE ENCOUNTER
Received carelink loop recorder event report. Called and left message for patient to return call. Will need her to send a full report to obtain more information.

## 2021-06-23 NOTE — TELEPHONE ENCOUNTER
Patient returned call and stated she had episode of Nausea and vomiting. She stated she went to [facility].  [facility].  Julius Richardson her to send a full report.   The tachycardia episodes may be related to her multiple vomiting episodes she had

## 2021-06-24 NOTE — TELEPHONE ENCOUNTER
Received another event report -tachy episode from 6/23 lasting over a minute. Patient notified, she is on her way to a GI appointment.

## 2021-06-28 DIAGNOSIS — Z95.818 STATUS POST PLACEMENT OF IMPLANTABLE LOOP RECORDER: Primary | ICD-10-CM

## 2021-06-28 DIAGNOSIS — I10 ESSENTIAL HYPERTENSION: ICD-10-CM

## 2021-06-28 DIAGNOSIS — R00.2 PALPITATIONS: ICD-10-CM

## 2021-07-03 ENCOUNTER — HOSPITAL ENCOUNTER (EMERGENCY)
Age: 81
Discharge: HOME | End: 2021-07-03
Attending: EMERGENCY MEDICINE
Payer: MEDICARE

## 2021-07-03 VITALS
OXYGEN SATURATION: 76 % | HEIGHT: 65 IN | DIASTOLIC BLOOD PRESSURE: 99 MMHG | WEIGHT: 162 LBS | TEMPERATURE: 97.9 F | HEART RATE: 85 BPM | BODY MASS INDEX: 26.99 KG/M2 | SYSTOLIC BLOOD PRESSURE: 122 MMHG | RESPIRATION RATE: 25 BRPM

## 2021-07-03 DIAGNOSIS — R00.0 TACHYCARDIA: ICD-10-CM

## 2021-07-03 DIAGNOSIS — R53.83 FATIGUE, UNSPECIFIED TYPE: ICD-10-CM

## 2021-07-03 DIAGNOSIS — R42 DIZZINESS: Primary | ICD-10-CM

## 2021-07-03 DIAGNOSIS — R79.89 LOW SERUM THYROID STIMULATING HORMONE (TSH): ICD-10-CM

## 2021-07-03 DIAGNOSIS — D64.9 ANEMIA, UNSPECIFIED TYPE: ICD-10-CM

## 2021-07-03 LAB
ALBUMIN SERPL-MCNC: 3.7 G/DL (ref 3.5–5.2)
ALP BLD-CCNC: 100 U/L (ref 35–104)
ALT SERPL-CCNC: 13 U/L (ref 5–33)
ANION GAP SERPL CALCULATED.3IONS-SCNC: 10 MMOL/L (ref 7–19)
AST SERPL-CCNC: 15 U/L (ref 5–32)
BACTERIA: NEGATIVE /HPF
BASOPHILS ABSOLUTE: 0 K/UL (ref 0–0.2)
BASOPHILS RELATIVE PERCENT: 0.2 % (ref 0–1)
BILIRUB SERPL-MCNC: 0.6 MG/DL (ref 0.2–1.2)
BILIRUBIN URINE: NEGATIVE
BLOOD, URINE: NEGATIVE
BUN BLDV-MCNC: 23 MG/DL (ref 8–23)
CALCIUM SERPL-MCNC: 9 MG/DL (ref 8.8–10.2)
CHLORIDE BLD-SCNC: 101 MMOL/L (ref 98–111)
CLARITY: CLEAR
CO2: 24 MMOL/L (ref 22–29)
COLOR: YELLOW
CREAT SERPL-MCNC: 0.8 MG/DL (ref 0.5–0.9)
CRYSTALS, UA: ABNORMAL /HPF
EOSINOPHILS ABSOLUTE: 0.1 K/UL (ref 0–0.6)
EOSINOPHILS RELATIVE PERCENT: 0.7 % (ref 0–5)
EPITHELIAL CELLS, UA: 2 /HPF (ref 0–5)
GFR AFRICAN AMERICAN: >59
GFR NON-AFRICAN AMERICAN: >60
GLUCOSE BLD-MCNC: 116 MG/DL (ref 74–109)
GLUCOSE URINE: NEGATIVE MG/DL
HCT VFR BLD CALC: 32.7 % (ref 37–47)
HEMOGLOBIN: 10.4 G/DL (ref 12–16)
HYALINE CASTS: 2 /HPF (ref 0–8)
IMMATURE GRANULOCYTES #: 0 K/UL
KETONES, URINE: NEGATIVE MG/DL
LEUKOCYTE ESTERASE, URINE: ABNORMAL
LYMPHOCYTES ABSOLUTE: 1.1 K/UL (ref 1.1–4.5)
LYMPHOCYTES RELATIVE PERCENT: 10 % (ref 20–40)
MCH RBC QN AUTO: 28.3 PG (ref 27–31)
MCHC RBC AUTO-ENTMCNC: 31.8 G/DL (ref 33–37)
MCV RBC AUTO: 88.9 FL (ref 81–99)
MONOCYTES ABSOLUTE: 1 K/UL (ref 0–0.9)
MONOCYTES RELATIVE PERCENT: 9 % (ref 0–10)
NEUTROPHILS ABSOLUTE: 8.6 K/UL (ref 1.5–7.5)
NEUTROPHILS RELATIVE PERCENT: 79.8 % (ref 50–65)
NITRITE, URINE: NEGATIVE
PDW BLD-RTO: 12.2 % (ref 11.5–14.5)
PH UA: 5.5 (ref 5–8)
PLATELET # BLD: 214 K/UL (ref 130–400)
PMV BLD AUTO: 9.5 FL (ref 9.4–12.3)
POTASSIUM REFLEX MAGNESIUM: 4.2 MMOL/L (ref 3.5–5)
PROTEIN UA: NEGATIVE MG/DL
RBC # BLD: 3.68 M/UL (ref 4.2–5.4)
RBC UA: 1 /HPF (ref 0–4)
SODIUM BLD-SCNC: 135 MMOL/L (ref 136–145)
SPECIFIC GRAVITY UA: 1.02 (ref 1–1.03)
TOTAL PROTEIN: 6.3 G/DL (ref 6.6–8.7)
TROPONIN: <0.01 NG/ML (ref 0–0.03)
TSH SERPL DL<=0.05 MIU/L-ACNC: <0.005 UIU/ML (ref 0.27–4.2)
UROBILINOGEN, URINE: 1 E.U./DL
WBC # BLD: 10.7 K/UL (ref 4.8–10.8)
WBC UA: 2 /HPF (ref 0–5)

## 2021-07-03 PROCEDURE — 99282 EMERGENCY DEPT VISIT SF MDM: CPT

## 2021-07-03 PROCEDURE — 36415 COLL VENOUS BLD VENIPUNCTURE: CPT

## 2021-07-03 PROCEDURE — 81001 URINALYSIS AUTO W/SCOPE: CPT

## 2021-07-03 PROCEDURE — 93005 ELECTROCARDIOGRAM TRACING: CPT | Performed by: EMERGENCY MEDICINE

## 2021-07-03 PROCEDURE — 80053 COMPREHEN METABOLIC PANEL: CPT

## 2021-07-03 PROCEDURE — 84484 ASSAY OF TROPONIN QUANT: CPT

## 2021-07-03 PROCEDURE — 2580000003 HC RX 258: Performed by: EMERGENCY MEDICINE

## 2021-07-03 PROCEDURE — 85025 COMPLETE CBC W/AUTO DIFF WBC: CPT

## 2021-07-03 PROCEDURE — 84443 ASSAY THYROID STIM HORMONE: CPT

## 2021-07-03 RX ORDER — METOPROLOL SUCCINATE 25 MG/1
75 TABLET, EXTENDED RELEASE ORAL DAILY
Qty: 90 TABLET | Refills: 0
Start: 2021-07-03 | End: 2021-08-11

## 2021-07-03 RX ORDER — SODIUM CHLORIDE, SODIUM LACTATE, POTASSIUM CHLORIDE, CALCIUM CHLORIDE 600; 310; 30; 20 MG/100ML; MG/100ML; MG/100ML; MG/100ML
1000 INJECTION, SOLUTION INTRAVENOUS ONCE
Status: COMPLETED | OUTPATIENT
Start: 2021-07-03 | End: 2021-07-03

## 2021-07-03 RX ADMIN — SODIUM CHLORIDE, POTASSIUM CHLORIDE, SODIUM LACTATE AND CALCIUM CHLORIDE 1000 ML: 600; 310; 30; 20 INJECTION, SOLUTION INTRAVENOUS at 13:37

## 2021-07-03 ASSESSMENT — ENCOUNTER SYMPTOMS
VOICE CHANGE: 0
NAUSEA: 0
DIARRHEA: 0
APNEA: 0
SORE THROAT: 0
SINUS PRESSURE: 0
CONSTIPATION: 0
BLOOD IN STOOL: 0
VOMITING: 0
COUGH: 0
ABDOMINAL PAIN: 0
FACIAL SWELLING: 0
EYE DISCHARGE: 0
SHORTNESS OF BREATH: 0
CHOKING: 0

## 2021-07-03 NOTE — ED PROVIDER NOTES
[facility] EMERGENCY DEPT  eMERGENCY dEPARTMENT eNCOUnter      Pt Name: Supa Beck  MRN: [id number]  [place] 1940  Date of evaluation: 7/3/2021  Provider: Melisa Ovalle MD    [address] [place] [address]       Chief Complaint   Patient presents with    Dizziness    Fatigue         HISTORY OF PRESENT ILLNESS   (Location/Symptom, Timing/Onset,Context/Setting, Quality, Duration, Modifying Factors, Severity)  Note limiting factors. Supa Beck is a 80 y.o. female who presents to the emergency department tenderness and fatigue    80year-old female presents for evaluation of what sounds like worsening dizziness nonvertiginous. And fatigue. She told me she has a loop recorder she has had that for 2 or 3 years. That she had a heart cath that she says was normal in March with Dr. Mark Haynes. At that time she was started on Toprol-XL. She brings in some of her event diary on a few pieces of paper her blood pressure fluctuate some but nothing that is really critical heart rate up in the 124 1 time. Review of chart shows on the 25th or so of June this year she had a download from her loop recorder it showed 1 episode of tachycardia but no other arrhythmia according to the note. She denies vertiginous symptoms she says she is dizzy when she stands up or bends forward. But she can lay down or roll over in bed without exacerbating any dizziness symptoms. Recently she had a gastroenteritis approximately 2 weeks ago she is scheduled for follow-up colonoscopy and endoscopy has severe reflux. She denies diarrhea right now she does take MiraLAX to stay regular it does not seem that she is off on her p.o. intake any considering dehydration. She is here with her granddaughter who works in the surgery department. Patient is denying any infectious symptoms otherwise. She had Covid last year for 7 weeks she has not received a vaccination but tells me that she had lab work showing she had the antibody. She denies chest pain.   After she was taken off theophylline her chest pain symptoms from ago went away. The history is provided by the patient, medical records and a relative. NursingNotes were reviewed. REVIEW OF SYSTEMS    (2-9 systems for level 4, 10 or more for level 5)     Review of Systems   Constitutional: Positive for fatigue. Negative for chills, fever and unexpected weight change. HENT: Negative for congestion, drooling, facial swelling, nosebleeds, sinus pressure, sore throat and voice change. Eyes: Negative for discharge. Respiratory: Negative for apnea, cough, choking and shortness of breath. History of asthma. Cardiovascular: Negative for chest pain and leg swelling. Gastrointestinal: Negative for abdominal pain, blood in stool, constipation, diarrhea, nausea and vomiting. Recovering from gastroenteritis. States history of bad reflux   Genitourinary: Negative for enuresis. Musculoskeletal: Negative for joint swelling. Skin: Negative for rash and wound. Neurological: Positive for dizziness. Negative for seizures, syncope and headaches. Psychiatric/Behavioral: Negative for behavioral problems, hallucinations and suicidal ideas. All other systems reviewed and are negative. A complete review of systems was performed and is negative except as noted above in the HPI.        PAST MEDICAL HISTORY     Past Medical History:   Diagnosis Date    Abdominal pain, other specified site     Asthma     Cataract     COVID-19 2020    GERD (gastroesophageal reflux disease)     Glaucoma     Headache     Hx of blood clots     Hypertension     Status post placement of implantable loop recorder 2018    Thyroid disease     Vitamin D deficiency          SURGICAL HISTORY       Past Surgical History:   Procedure Laterality Date    APPENDECTOMY      CATARACT REMOVAL      CERVICAL DISC SURGERY       SECTION      CHOLECYSTECTOMY      COLONOSCOPY  2009    Dr Whalen Level COLONOSCOPY  1-    Dr Miles Pacheco ENDOSCOPY  5-4-2009    Dr Melanie Zapien ENDOSCOPY  1-    Dr Valiente Marker       Current Discharge Medication List      CONTINUE these medications which have NOT CHANGED    Details   valsartan (DIOVAN) 80 MG tablet Take 1 tablet by mouth daily  Qty: 90 tablet, Refills: 3      fluticasone-umeclidin-vilant (TRELEGY ELLIPTA) 100-62.5-25 MCG/INH AEPB Inhale 1 puff into the lungs daily      OXYGEN Inhale into the lungs nightly As needed      diclofenac sodium 1 % GEL 2 g as needed       furosemide (LASIX) 40 MG tablet Take 40 mg by mouth as needed       esomeprazole Magnesium (NEXIUM) 40 MG PACK Take 40 mg by mouth 2 times daily       escitalopram (LEXAPRO) 10 MG tablet Take 10 mg by mouth daily       levothyroxine (SYNTHROID) 50 MCG tablet Take 50 mcg by mouth Daily      ergocalciferol (ERGOCALCIFEROL) 78052 UNITS capsule Take 50,000 Units by mouth once a week      Probiotic Product (PROBIOTIC ADVANCED PO) Take by mouth daily       Albuterol Sulfate (PROAIR HFA IN) Inhale  into the lungs as needed.              ALLERGIES     Latex, Tramadol, Codeine, and Marcaine [bupivacaine hcl]    FAMILY HISTORY       Family History   Problem Relation Age of Onset    Cancer Sister         breast cancer    Heart Disease Brother     Heart Disease Brother     Colon Polyps Neg Hx     COPD Neg Hx     Esophageal Cancer Neg Hx     Liver Cancer Neg Hx     Liver Disease Neg Hx     Rectal Cancer Neg Hx     Stomach Cancer Neg Hx           SOCIAL HISTORY       Social History     Socioeconomic History    Marital status:      Spouse name: None    Number of children: None    Years of education: None    Highest education level: None   Occupational History    None   Tobacco Use    Smoking status: Never Smoker    Smokeless tobacco: Never Used   Vaping Use    Vaping Use: Never used   Substance and Sexual Activity    Alcohol use: No    Drug use: No    Sexual activity: None   Other Topics Concern    None   Social History Narrative    None     Social Determinants of Health     Financial Resource Strain:     Difficulty of Paying Living Expenses:    Food Insecurity:     Worried About Running Out of Food in the Last Year:     [address] in the Last Year:    Transportation Needs:     Lack of Transportation (Medical):  Lack of Transportation (Non-Medical):    Physical Activity:     Days of Exercise per Week:     Minutes of Exercise per Session:    Stress:     Feeling of Stress :    Social Connections:     Frequency of Communication with Friends and Family:     Frequency of Social Gatherings with Friends and Family:     Attends Voodoo Services:     Active Member of Clubs or Organizations:     Attends Club or Organization Meetings:     Marital Status:    Intimate Partner Violence:     Fear of Current or Ex-Partner:     Emotionally Abused:     Physically Abused:     Sexually Abused:        SCREENINGS             PHYSICAL EXAM    (up to 7 for level 4, 8 or more for level 5)     ED Triage Vitals [07/03/21 1242]   BP Temp Temp Source Pulse Resp SpO2 Height Weight   (!) 137/50 97.9 °F (36.6 °C) Oral 90 19 92 % 5' 5\" (1.651 m) 162 lb (73.5 kg)       Physical Exam  Vitals and nursing note reviewed. Constitutional:       General: She is not in acute distress. Appearance: She is well-developed. She is not ill-appearing. HENT:      Head: Normocephalic and atraumatic. Right Ear: External ear normal.      Left Ear: External ear normal.   Eyes:      General: No scleral icterus. Conjunctiva/sclera: Conjunctivae normal.      Pupils: Pupils are equal, round, and reactive to light. Neck:      Vascular: No carotid bruit. Cardiovascular:      Rate and Rhythm: Normal rate and regular rhythm. Pulses: Normal pulses.       Heart sounds: Normal heart sounds. No murmur heard. Pulmonary:      Effort: Pulmonary effort is normal. No respiratory distress. Breath sounds: Normal breath sounds. No wheezing or rales. Abdominal:      General: Bowel sounds are normal.      Palpations: Abdomen is soft. Tenderness: There is no abdominal tenderness. Musculoskeletal:         General: No swelling or signs of injury. Normal range of motion. Cervical back: Normal range of motion and neck supple. Skin:     General: Skin is warm and dry. Coloration: Skin is not jaundiced or pale. Findings: No rash. Neurological:      General: No focal deficit present. Mental Status: She is alert and oriented to person, place, and time. Psychiatric:         Mood and Affect: Mood normal.         Behavior: Behavior normal.         DIAGNOSTIC RESULTS     EKG: All EKG's are interpreted by the Emergency Department Physician who either signs or Co-signs this chart in the absence of a cardiologist.    Sinus rhythm. Rate 86.   I do not see any changes from an EKG compared on March 23, 2021 at 2:27 PM.    RADIOLOGY:   Non-plain film images such as CT, Ultrasound and MRI are read by the radiologist. Nish Pollen images are visualized and preliminarily interpreted by the emergency physician with the below findings:        Interpretation per the Radiologist below, if available at the time of this note:    No orders to display         ED BEDSIDE ULTRASOUND:   Performed by ED Physician - none    LABS:  Labs Reviewed   CBC WITH AUTO DIFFERENTIAL - Abnormal; Notable for the following components:       Result Value    RBC 3.68 (*)     Hemoglobin 10.4 (*)     Hematocrit 32.7 (*)     MCHC 31.8 (*)     Neutrophils % 79.8 (*)     Lymphocytes % 10.0 (*)     Neutrophils Absolute 8.6 (*)     Monocytes Absolute 1.00 (*)     All other components within normal limits   COMPREHENSIVE METABOLIC PANEL W/ REFLEX TO MG FOR LOW K - Abnormal; Notable for the following Patient feels better she is stable for discharge. CONSULTS:  None    PROCEDURES:  Unless otherwise notedbelow, none     Procedures    FINAL IMPRESSION     1. Dizziness    2. Fatigue, unspecified type    3. Low serum thyroid stimulating hormone (TSH)    4. Anemia, unspecified type    5.  Tachycardia          DISPOSITION/PLAN   DISPOSITION Decision To Discharge 07/03/2021 04:27:35 PM      PATIENT REFERRED TO:  @FUP@    DISCHARGE MEDICATIONS:  Current Discharge Medication List             (Please note that portions of this note were completed with a voice recognition program.  Efforts were made to edit the dictations butoccasionally words are mis-transcribed.)    Danette Yin MD (electronically signed)  AttendingEmergency Physician          Gabriela Santos MD  07/03/21 [id number]

## 2021-07-06 DIAGNOSIS — I10 ESSENTIAL HYPERTENSION: ICD-10-CM

## 2021-07-06 DIAGNOSIS — Z95.818 STATUS POST PLACEMENT OF IMPLANTABLE LOOP RECORDER: Primary | ICD-10-CM

## 2021-07-06 DIAGNOSIS — R00.2 PALPITATIONS: ICD-10-CM

## 2021-07-06 LAB
EKG P AXIS: 52 DEGREES
EKG P-R INTERVAL: 126 MS
EKG Q-T INTERVAL: 330 MS
EKG QRS DURATION: 82 MS
EKG QTC CALCULATION (BAZETT): 373 MS
EKG T AXIS: 50 DEGREES

## 2021-07-06 PROCEDURE — 93010 ELECTROCARDIOGRAM REPORT: CPT | Performed by: INTERNAL MEDICINE

## 2021-07-15 DIAGNOSIS — R55 SYNCOPE, UNSPECIFIED SYNCOPE TYPE: ICD-10-CM

## 2021-07-15 DIAGNOSIS — Z95.818 STATUS POST PLACEMENT OF IMPLANTABLE LOOP RECORDER: Primary | ICD-10-CM

## 2021-07-15 PROCEDURE — 93298 REM INTERROG DEV EVAL SCRMS: CPT | Performed by: NURSE PRACTITIONER

## 2021-07-15 PROCEDURE — G2066 INTER DEVC REMOTE 30D: HCPCS | Performed by: NURSE PRACTITIONER

## 2021-08-11 RX ORDER — METOPROLOL SUCCINATE 25 MG/1
TABLET, EXTENDED RELEASE ORAL
Qty: 180 TABLET | Refills: 3 | Status: SHIPPED | OUTPATIENT
Start: 2021-08-11

## 2021-08-27 ENCOUNTER — TELEPHONE (OUTPATIENT)
Dept: CARDIOLOGY CLINIC | Age: 81
End: 2021-08-27

## 2021-08-27 NOTE — TELEPHONE ENCOUNTER
Called to remind patient of carelink report, stated that battery was bad and that she was going to bring to our office